# Patient Record
Sex: MALE | Race: WHITE | Employment: FULL TIME | URBAN - METROPOLITAN AREA
[De-identification: names, ages, dates, MRNs, and addresses within clinical notes are randomized per-mention and may not be internally consistent; named-entity substitution may affect disease eponyms.]

---

## 2017-04-06 ENCOUNTER — OFFICE VISIT (OUTPATIENT)
Dept: NEUROLOGY | Age: 57
End: 2017-04-06

## 2017-04-06 VITALS
BODY MASS INDEX: 29.58 KG/M2 | SYSTOLIC BLOOD PRESSURE: 120 MMHG | WEIGHT: 222 LBS | HEART RATE: 68 BPM | OXYGEN SATURATION: 99 % | DIASTOLIC BLOOD PRESSURE: 82 MMHG

## 2017-04-06 DIAGNOSIS — M50.30 DEGENERATIVE DISC DISEASE, CERVICAL: ICD-10-CM

## 2017-04-06 DIAGNOSIS — E78.01 FAMILIAL HYPERCHOLESTEROLEMIA: ICD-10-CM

## 2017-04-06 DIAGNOSIS — I10 ESSENTIAL HYPERTENSION: ICD-10-CM

## 2017-04-06 DIAGNOSIS — M54.12 CERVICAL RADICULOPATHY: Primary | ICD-10-CM

## 2017-04-06 NOTE — MR AVS SNAPSHOT
Visit Information Date & Time Provider Department Dept. Phone Encounter #  
 4/6/2017  9:00 AM Eliazar Urban MD [facility] at [facility] [phone] [id number] Follow-up Instructions Return if symptoms worsen or fail to improve. Allergies as of 4/6/2017  Review Complete On: 4/6/2017 By: Frank Harada No Known Allergies Current Immunizations  Never Reviewed Name Date Tdap 8/7/2015  6:44 PM  
  
 Not reviewed this visit You Were Diagnosed With   
  
 Codes Comments Cervical radiculopathy    -  Primary ICD-10-CM: M54.12 
ICD-9-CM: 723.4 Essential hypertension     ICD-10-CM: I10 
ICD-9-CM: 401.9 Familial hypercholesterolemia     ICD-10-CM: E78.01 
ICD-9-CM: 272.0 Vitals BP Pulse Weight(growth percentile) SpO2 BMI Smoking Status 120/82 68 222 lb (100.7 kg) 99% 29.58 kg/m2 Never Smoker BMI and BSA Data Body Mass Index Body Surface Area  
 29.58 kg/m 2 2.27 m 2 Preferred Pharmacy Pharmacy Name Phone [facility] [place], [address] [place] AT [facility] [phone] Your Updated Medication List  
  
   
This list is accurate as of: 4/6/17  9:41 AM.  Always use your most recent med list.  
  
  
  
  
 atorvastatin 40 mg tablet Commonly known as:  LIPITOR Take 40 mg by mouth daily. Follow-up Instructions Return if symptoms worsen or fail to improve. Introducing [facility]! [employer] introduces [employer] patient portal. Now you can access parts of your medical record, email your doctor's office, and request medication refills online. 1. In your internet browser, go to [email or web address] 2. Click on the First Time User? Click Here link in the Sign In box. You will see the New Member Sign Up page. 3. Enter your [employer] Access Code exactly as it appears below.  You will not need to use this code after youve completed the sign-up process. If you do not sign up before the expiration date, you must request a new code. · [employer] Access Code: [email or web address] Expires: 7/5/2017  8:53 AM 
 
4. Enter the last four digits of your Social Security Number (xxxx) and Date of Birth (mm/dd/yyyy) as indicated and click Submit. You will be taken to the next sign-up page. 5. Create a [employer] ID. This will be your [employer] login ID and cannot be changed, so think of one that is secure and easy to remember. 6. Create a [employer] password. You can change your password at any time. 7. Enter your Password Reset Question and Answer. This can be used at a later time if you forget your password. 8. Enter your e-mail address. You will receive e-mail notification when new information is available in [address] 9. Click Sign Up. You can now view and download portions of your medical record. 10. Click the Download Summary menu link to download a portable copy of your medical information. If you have questions, please visit the Frequently Asked Questions section of the [employer] website. Remember, [employer] is NOT to be used for urgent needs. For medical emergencies, dial 911. Now available from your iPhone and Android! Please provide this summary of care documentation to your next provider. Your primary care clinician is listed as Connor Butler. If you have any questions after today's visit, please call [phone].

## 2017-04-06 NOTE — PROGRESS NOTES
NEUROLOGY HISTORY AND PHYSICAL    Name Winston [address] Age 55 y.o. MRN [id number]  1960     Consulting Physician: No att. providers found      Chief Complaint:  Right arm sensory loss     This is a 55 y.o. right handed male with a medical history of hypertension and hyperlipidemia. He was driving his car. He suddenly felt that his arm had gone numb. He had no weakness. He had no facial numbness. He had no numbness of the torso. He had experienced right ulnar nerve numbness in the past from known degenerative disc disease. The episode lasted approximately 10 minutes and resolved. It has not recurred since. Assessment and Plan   1. Cervical radiculopathy  No symptoms at this time. Will evaluate if this becomes  A permament issue    2. Degenerative disc disease, cervical  Discussed      3. Essential hypertension  Discussed     4. Familial hypercholesterolemia  dicussed continue lipitor      Patient Allergies    Review of patient's allergies indicates no known allergies. Current Outpatient Prescriptions   Medication Sig    atorvastatin (LIPITOR) 40 mg tablet Take 40 mg by mouth daily. No current facility-administered medications for this visit. Past Medical History:   Diagnosis Date    Arthritis     High blood pressure     Ill-defined condition     kidney stones    Joint pain     Migraine     Muscle weakness     Ringing in the ears     Snoring     Visual disturbance        Social History   Substance Use Topics    Smoking status: Never Smoker    Smokeless tobacco: Not on file    Alcohol use No     Family History   Problem Relation Age of Onset    Stroke Mother     Colon Cancer Brother      Review of Systems   Constitutional: Negative for chills and fever. HENT: Negative for ear pain. Eyes: Negative for pain and discharge. Respiratory: Negative for cough and hemoptysis. Cardiovascular: Negative for chest pain and claudication.    Gastrointestinal: Negative for constipation and diarrhea. Genitourinary: Negative for flank pain and hematuria. Musculoskeletal: Positive for joint pain, myalgias and neck pain. Negative for back pain. Left wrist pain chronic   Skin: Negative for itching and rash. Neurological: Negative for headaches. Ocular migraines   Endo/Heme/Allergies: Negative for environmental allergies. Does not bruise/bleed easily. Psychiatric/Behavioral: Negative for depression and hallucinations. The patient is nervous/anxious. Visit Vitals    /82    Pulse 68    Wt 222 lb (100.7 kg)    SpO2 99%    BMI 29.58 kg/m2      Physical Exam   Constitutional: He is oriented to person, place, and time and well-developed, well-nourished, and in no distress. HENT:   Head: Normocephalic and atraumatic. Eyes: Conjunctivae and EOM are normal. Pupils are equal, round, and reactive to light. Neck: Neck supple. No JVD present. Carotid bruit is not present. No thyromegaly present. Cardiovascular: Normal rate, regular rhythm and normal heart sounds. Pulmonary/Chest: Effort normal and breath sounds normal. No respiratory distress. He has no wheezes. He has no rales. Abdominal: Soft. Bowel sounds are normal. He exhibits no distension. There is no tenderness. Neurological: He is alert and oriented to person, place, and time. He has normal sensation, normal strength, normal reflexes and intact cranial nerves. Gait normal.   Skin: No rash noted. No erythema.

## 2019-02-11 ENCOUNTER — OFFICE VISIT (OUTPATIENT)
Dept: SLEEP MEDICINE | Age: 59
End: 2019-02-11

## 2019-02-11 VITALS
HEART RATE: 82 BPM | SYSTOLIC BLOOD PRESSURE: 106 MMHG | WEIGHT: 220.6 LBS | HEIGHT: 72 IN | BODY MASS INDEX: 29.88 KG/M2 | OXYGEN SATURATION: 96 % | DIASTOLIC BLOOD PRESSURE: 70 MMHG

## 2019-02-11 DIAGNOSIS — G47.33 OSA (OBSTRUCTIVE SLEEP APNEA): Primary | ICD-10-CM

## 2019-02-11 DIAGNOSIS — I10 ESSENTIAL HYPERTENSION: ICD-10-CM

## 2019-02-11 DIAGNOSIS — Z86.59 H/O: DEPRESSION: ICD-10-CM

## 2019-02-11 RX ORDER — LISINOPRIL 10 MG/1
10 TABLET ORAL DAILY
Refills: 6 | COMMUNITY
Start: 2019-01-28

## 2019-02-11 RX ORDER — TAMSULOSIN HYDROCHLORIDE 0.4 MG/1
0.4 CAPSULE ORAL DAILY
Refills: 0 | COMMUNITY
Start: 2019-01-25

## 2019-02-11 RX ORDER — DULOXETIN HYDROCHLORIDE 30 MG/1
30 CAPSULE, DELAYED RELEASE ORAL DAILY
Refills: 1 | COMMUNITY
Start: 2019-02-05

## 2019-02-11 RX ORDER — CIPROFLOXACIN 500 MG/1
500 TABLET ORAL 2 TIMES DAILY
Refills: 0 | COMMUNITY
Start: 2019-01-25 | End: 2022-07-11 | Stop reason: ALTCHOICE

## 2019-02-11 NOTE — PROGRESS NOTES
[address] [place], [address]  Tel.  [phone]  Fax. [address] [facility] [address]  [place], [address] [facility]  Tel.  [phone]  Fax. [phone] [address] [facility]  Tel.  [phone]  Fax. [phone]         Subjective:      Kim Izquierdo is an 63 y.o. male referred for evaluation for a sleep disorder. He complains of snoring associated with tossing and turning, kicking, excessive daytime tiredness. Symptoms began several years ago, unchanged since that time. He usually can fall asleep in 5 minutes. Family or house members note snoring. He denies completely or partially paralyzed while falling asleep or waking up. Winston Moreno does wake up frequently at night. He is not bothered by waking up too early and left unable to get back to sleep. He actually sleeps about 6 hours at night and wakes up about 2 times during the night. He does work shifts:  First Shift. Winston indicates he does not get too little sleep at night. His bedtime is 2200. He awakens at 0. He does not take naps. He has the following observed behaviors: Loud snoring, Twitching of legs or feet, Kicking with legs;  . Other remarks:   He denies of a significant urge to move extremities due to discomfort or other sensation and denies of dream enactment behavior. [place] Sleepiness Score: 4     No Known Allergies      Current Outpatient Medications:     DULoxetine (CYMBALTA) 30 mg capsule, Take 30 mg by mouth daily. , Disp: , Rfl: 1    lisinopril (PRINIVIL, ZESTRIL) 10 mg tablet, Take 10 mg by mouth daily. , Disp: , Rfl: 6    tamsulosin (FLOMAX) 0.4 mg capsule, Take 0.4 mg by mouth daily. , Disp: , Rfl: 0    ciprofloxacin HCl (CIPRO) 500 mg tablet, Take 500 mg by mouth two (2) times a day., Disp: , Rfl: 0    atorvastatin (LIPITOR) 40 mg tablet, Take 40 mg by mouth daily. , Disp: , Rfl:      He  has a past medical history of Arthritis, High blood pressure, Ill-defined condition, Joint pain, Migraine, Muscle weakness, Ringing in the ears, Snoring, and Visual disturbance. He  has a past surgical history that includes hx other surgical.    He family history includes Colon Cancer in his brother; Stroke in his mother. He  reports that he has quit smoking. he has never used smokeless tobacco. He reports that he drinks alcohol. He reports that he does not use drugs. Review of Systems:  Constitutional:  No significant weight loss or weight gain  Eyes:  No blurred vision  CVS:  No significant chest pain  Pulm:  No significant shortness of breath  GI:  No significant nausea or vomiting  :  significant nocturia  Musculoskeletal:  No significant joint pain at night  Skin:  No significant rashes  Neuro:  No significant dizziness   Psych:  active mood issues - recently started taking    Sleep Review of Systems: notable for no difficulty falling asleep; frequent awakenings at night;  regular dreaming noted; no nightmares ; no early morning headaches; no memory problems; no concentration issues; no history of any automobile or occupational accidents due to daytime drowsiness. Objective:     Visit Vitals  /70 (BP 1 Location: Left arm)   Pulse 82   Ht 6' (1.829 m)   Wt 220 lb 9.6 oz (100.1 kg)   SpO2 96%   BMI 29.92 kg/m²         General:   Not in acute distress   Eyes:  Anicteric sclerae, no obvious strabismus   Nose:  No obvious nasal septum deviation    Oropharynx:   Class 4 oropharyngeal outlet, thick tongue base, uvula could not be seen due to low-lying soft palate, narrow tonsilo-pharyngeal pilars   Tonsils:   tonsils are not seen due to low-lying soft palate   Neck:   Neck circ.  in \"inches\": 16.5; midline trachea   Chest/Lungs:  Equal lung expansion, clear on auscultation    CVS:  Normal rate, regular rhythm; no JVD   Skin:  Warm to touch; no obvious rashes   Neuro:  No focal deficits ; no obvious tremor    Psych:  Normal affect,  normal countenance; Assessment:       ICD-10-CM ICD-9-CM    1. JULIANNE (obstructive sleep apnea) G47.33 327.23 SLEEP STUDY UNATTENDED, 4 CHANNEL   2. Essential hypertension I10 401.9    3. BMI 29.0-29.9,adult Z68.29 V85.25    4. H/O: depression Z86.59 V11.8          Plan:     * The patient currently has a Moderate Risk for having sleep apnea. STOP-BANG score 5.  * Sleep testing was ordered for initial evaluation. * He was provided information on sleep apnea including coresponding risk factors and the importance of proper treatment. * Treatment options if indicated were reviewed today. Patient agrees to a trial of PAP therapy if indicated. * Counseling was provided regarding proper sleep hygiene (including effect of light on sleep), paradoxical intention, stimulus control, sleep environment safety and safe driving. * Effect of sleep disturbance on weight was reviewed. We have recommended a dedicated weight loss through appropriate diet and an exercise regiment as significant weight reduction has been shown to reduce severity of obstructive sleep apnea. * Patient agrees to telephone [phone]  follow-up by myself or lead sleep technologist shortly after sleep study to review results and plan final management.     (patient has given permission for a message to be left regarding test results and further management if patient cannot be cannot be reached directly). Thank you for allowing us to participate in your patient's medical care. We'll keep you updated on these investigations. Rock Ryan MD, FAASM  Electronically signed.  02/11/19

## 2019-02-11 NOTE — PROGRESS NOTES
[address] [facility]. [place], [address]  Tel.  [phone]  Fax. 100 [facility] 60  [place], [address] [facility]  Tel.  [phone]  Fax. [phone] [address] [facility] Abelardo Collazo   Tel.  [phone]  Fax. [phone]       S>Winston Pradhan is a 63 y.o. male seen today to receive a home sleep testing unit (HST). · Patient was educated on proper hookup and operation of the HST. · Instruction forms and documentation were reviewed and signed. · The patient demonstrated good understanding of the HST. O>    Visit Vitals  /70 (BP 1 Location: Left arm)   Pulse 82   Ht 6' (1.829 m)   Wt 220 lb 9.6 oz (100.1 kg)   SpO2 96%   BMI 29.92 kg/m²    Neck circ. in \"inches\": 16.5    A>  1. JULIANNE (obstructive sleep apnea)    2. Essential hypertension    3. BMI 29.0-29.9,adult    4. H/O: depression          P>  · General information regarding operations and maintenance of the device was provided. · He was provided information on sleep apnea including coresponding risk factors and the importance of proper treatment. · Follow-up appointment was made to return the HST. He will be contacted once the results have been reviewed. · He was asked to contact our office for any problems regarding his home sleep test study.

## 2019-02-11 NOTE — PATIENT INSTRUCTIONS
[address] [facility] [place], [address]  Tel.  [phone]  Fax. 100 [facility] 60  [place], [address] [facility]  Tel.  [phone]  Fax. [phone] [address]  Tel.  [phone]  Fax. [phone]     Sleep Apnea: After Your Visit  Your Care Instructions  Sleep apnea occurs when you frequently stop breathing for 10 seconds or longer during sleep. It can be mild to severe, based on the number of times per hour that you stop breathing or have slowed breathing. Blocked or narrowed airways in your nose, mouth, or throat can cause sleep apnea. Your airway can become blocked when your throat muscles and tongue relax during sleep. Sleep apnea is common, occurring in 1 out of 20 individuals. Individuals having any of the following characteristics should be evaluated and treated right away due to high risk and detrimental consequences from untreated sleep apnea:  1. Obesity  2. Congestive Heart failure  3. Atrial Fibrillation  4. Uncontrolled Hypertension  5. Type II Diabetes  6. Night-time Arrhythmias  7. Stroke  8. Pulmonary Hypertension  9. High-risk Driving Populations (pilots, truck drivers, etc.)  10. Patients Considering Weight-loss Surgery    How do you know you have sleep apnea? You probably have sleep apnea if you answer 'yes' to 3 or more of the following questions:  S - Have you been told that you Snore? T - Are you often Tired during the day? O - Has anyone Observed you stop breathing while sleeping? P- Do you have (or are being treated for) high blood Pressure? B - Are you obese (Body Mass Index > 35)? A - Is your Age 48years old or older? N - Is your Neck size greater than 16 inches? G - Are you male Gender? A sleep physician can prescribe a breathing device that prevents tissues in the throat from blocking your airway.  Or your doctor may recommend using a dental device (oral breathing device) to help keep your airway open. In some cases, surgery may be needed to remove enlarged tissues in the throat. Follow-up care is a key part of your treatment and safety. Be sure to make and go to all appointments, and call your doctor if you are having problems. It's also a good idea to know your test results and keep a list of the medicines you take. How can you care for yourself at home? · Lose weight, if needed. It may reduce the number of times you stop breathing or have slowed breathing. · Go to bed at the same time every night. · Sleep on your side. It may stop mild apnea. If you tend to roll onto your back, sew a pocket in the back of your pajama top. Put a tennis ball into the pocket, and stitch the pocket shut. This will help keep you from sleeping on your back. · Avoid alcohol and medicines such as sleeping pills and sedatives before bed. · Do not smoke. Smoking can make sleep apnea worse. If you need help quitting, talk to your doctor about stop-smoking programs and medicines. These can increase your chances of quitting for good. · Prop up the head of your bed 4 to 6 inches by putting bricks under the legs of the bed. · Treat breathing problems, such as a stuffy nose, caused by a cold or allergies. · Use a continuous positive airway pressure (CPAP) breathing machine if lifestyle changes do not help your apnea and your doctor recommends it. The machine keeps your airway from closing when you sleep. · If CPAP does not help you, ask your doctor whether you should try other breathing machines. A bilevel positive airway pressure machine has two types of air pressureâone for breathing in and one for breathing out. Another device raises or lowers air pressure as needed while you breathe. · If your nose feels dry or bleeds when using one of these machines, talk with your doctor about increasing moisture in the air. A humidifier may help.   · If your nose is runny or stuffy from using a breathing machine, talk with your doctor about using decongestants or a corticosteroid nasal spray. When should you call for help? Watch closely for changes in your health, and be sure to contact your doctor if:  · You still have sleep apnea even though you have made lifestyle changes. · You are thinking of trying a device such as CPAP. · You are having problems using a CPAP or similar machine. Where can you learn more? Go to Xiaoi Robert. Enter Y745 in the search box to learn more about \"Sleep Apnea: After Your Visit. \"   © 7721-7059 Healthwise, Incorporated. Care instructions adapted under license by [facility] [employer] (which disclaims liability or warranty for this information). This care instruction is for use with your licensed healthcare professional. If you have questions about a medical condition or this instruction, always ask your healthcare professional. Disha Cutler any warranty or liability for your use of this information. PROPER SLEEP HYGIENE    What to avoid  · Do not have drinks with caffeine, such as coffee or black tea, for 8 hours before bed. · Do not smoke or use other types of tobacco near bedtime. Nicotine is a stimulant and can keep you awake. · Avoid drinking alcohol late in the evening, because it can cause you to wake in the middle of the night. · Do not eat a big meal close to bedtime. If you are hungry, eat a light snack. · Do not drink a lot of water close to bedtime, because the need to urinate may wake you up during the night. · Do not read or watch TV in bed. Use the bed only for sleeping and sexual activity. What to try  · Go to bed at the same time every night, and wake up at the same time every morning. Do not take naps during the day. · Keep your bedroom quiet, dark, and cool. · Get regular exercise, but not within 3 to 4 hours of your bedtime. .  · Sleep on a comfortable pillow and mattress.   · If watching the clock makes you anxious, turn it facing away from you so you cannot see the time. · If you worry when you lie down, start a worry book. Well before bedtime, write down your worries, and then set the book and your concerns aside. · Try meditation or other relaxation techniques before you go to bed. · If you cannot fall asleep, get up and go to another room until you feel sleepy. Do something relaxing. Repeat your bedtime routine before you go to bed again. · Make your house quiet and calm about an hour before bedtime. Turn down the lights, turn off the TV, log off the computer, and turn down the volume on music. This can help you relax after a busy day. Drowsy Driving  The [address] [place] [address] Road Traffic Safety Administration cites drowsiness as a causing factor in more than 464,214 police reported crashes annually, resulting in 76,000 injuries and 1,500 deaths. Other surveys suggest 55% of people polled have driven while drowsy in the past year, 23% had fallen asleep but not crashed, 3% crashed, and 2% had and accident due to drowsy driving. Who is at risk? Young Drivers: One study of drowsy driving accidents states that 55% of the drivers were under 25 years. Of those, 75% were male. Shift Workers and Travelers: People who work overnight or travel across time zones frequently are at higher risk of experiencing Circadian Rhythm Disorders. They are trying to work and function when their body is programed to sleep. Sleep Deprived: Lack of sleep has a serious impact on your ability to pay attention or focus on a task. Consistently getting less than the average of 8 hours your body needs creates partial or cumulative sleep deprivation. Untreated Sleep Disorders: Sleep Apnea, Narcolepsy, R.L.S., and other sleep disorders (untreated) prevent a person from getting enough restful sleep. This leads to excessive daytime sleepiness and increases the risk for drowsy driving accidents by up to 7 times.   Medications / Alcohol: Even over the counter medications can cause drowsiness. Medications that impair a drivers attention should have a warning label. Alcohol naturally makes you sleepy and on its own can cause accidents. Combined with excessive drowsiness its effects are amplified. Signs of Drowsy Driving:   * You don't remember driving the last few miles   * You may drift out of your azul   * You are unable to focus and your thoughts wander   * You may yawn more often than normal   * You have difficulty keeping your eyes open / nodding off   * Missing traffic signs, speeding, or tailgating  Prevention-   Good sleep hygiene, lifestyle and behavioral choices have the most impact on drowsy driving. There is no substitute for sleep and the average person requires 8 hours nightly. If you find yourself driving drowsy, stop and sleep. Consider the sleep hygiene tips provided during your visit as well. Medication Refill Policy: Refills for all medications require 1 week advance notice. Please have your pharmacy fax a refill request. We are unable to fax, or call in \"controled substance\" medications and you will need to pick these prescriptions up from our office. Miro Activation    Thank you for requesting access to Miro. Please follow the instructions below to securely access and download your online medical record. Miro allows you to send messages to your doctor, view your test results, renew your prescriptions, schedule appointments, and more. How Do I Sign Up? 1. In your internet browser, go to [email or web address]. 2. Click on the First Time User? Click Here link in the Sign In box. You will see the New Member Sign Up page. 3. Enter your Miro Access Code exactly as it appears below. You will not need to use this code after youve completed the sign-up process. If you do not sign up before the expiration date, you must request a new code. Miro Access Code:  [email or web address]  Expires: 3/28/2019 11:13 AM (This is the date your Indeed access code will )    4. Enter the last four digits of your Social Security Number (xxxx) and Date of Birth (mm/dd/yyyy) as indicated and click Submit. You will be taken to the next sign-up page. 5. Create a 3scalet ID. This will be your Indeed login ID and cannot be changed, so think of one that is secure and easy to remember. 6. Create a Indeed password. You can change your password at any time. 7. Enter your Password Reset Question and Answer. This can be used at a later time if you forget your password. 8. Enter your e-mail address. You will receive e-mail notification when new information is available in [address] 9. Click Sign Up. You can now view and download portions of your medical record. 10. Click the Download Summary menu link to download a portable copy of your medical information. Additional Information    If you have questions, please call [phone]. Remember, Indeed is NOT to be used for urgent needs. For medical emergencies, dial 911.

## 2019-02-12 ENCOUNTER — TELEPHONE (OUTPATIENT)
Dept: SLEEP MEDICINE | Age: 59
End: 2019-02-12

## 2019-02-12 NOTE — TELEPHONE ENCOUNTER
Patient called to inform us that he did not sleep well with the HSAT. [facility] return device tomorrow 2/13/19.

## 2019-02-21 ENCOUNTER — DOCUMENTATION ONLY (OUTPATIENT)
Dept: SLEEP MEDICINE | Age: 59
End: 2019-02-21

## 2019-02-22 ENCOUNTER — TELEPHONE (OUTPATIENT)
Dept: SLEEP MEDICINE | Age: 59
End: 2019-02-22

## 2020-08-19 NOTE — PROGRESS NOTES
Neurology Note    Patient ID:  Merrick Blackwell  [id number]  62 y.o.  1960      Date of Consultation:  August 20, 2020    Referring Physician: Dr. Jada Pineda    Reason for Consultation: Neurological symptoms    Subjective: I just do not feel right       History of Present Illness:   Merrick Blackwell is a 62 y.o. male who presents to the neurology clinic at [facility] for an evaluation. Patient states that he has battled with symptoms for some time but they continue to worsen. He does work as a  and is able to maintain full employment. He feels that he does have a good life but does not feel that he is functioning as well as he should. He states he feels like he is out of control of his body. He feels more disconnected. This can last for few seconds. He states that he does also have trouble getting out of bed in the morning. He has no motivation to go and do things. Once he starts in activity he feels that he is able to continue with that but he has trouble starting his day. He feels that he is depressed. He states he has tried medication and TMS therapy in the past and has only noticed a minimal amount improvement. He states sometimes the side effects of the medication have been worse. He has now started to take testosterone on his own accord to see if this would help him. He has done this for about 2 months and has not noticed any real improvement. He states he has had a few neurological attacks in the past he would notice focal weakness or sensory disturbance. He did have neuro imaging which was unremarkable. He also did have a panic attack in the past.    He feels that his weight is stable. He states he does have a history of ocular migraines    He also states he does have sensory disturbance and coldness in his feet.   He is unsure when the symptoms started but have been present for some time    He also reports that he has had episodes of benign positional vertigo which have responded to Epley maneuvers in the past      Past Medical History:   Diagnosis Date    Arthritis     High blood pressure     Ill-defined condition     kidney stones    Joint pain     Migraine     Muscle weakness     Ringing in the ears     Snoring     Visual disturbance       Slurred speech in the past. He was concerned about a stroke. Work-up was normal    Past Surgical History:   Procedure Laterality Date    HX OTHER SURGICAL      inguinal hernia        Family History   Problem Relation Age of Onset    Stroke Mother     Colon Cancer Brother     Heart Disease Father         Social History     Tobacco Use    Smoking status: Former Smoker    Smokeless tobacco: Never Used   Substance Use Topics    Alcohol use: Yes     Frequency: Monthly or less     Comment: 12 pack every wek-end        No Known Allergies     Prior to Admission medications    Medication Sig Start Date End Date Taking? Authorizing Provider   TESTOSTERONE CYPIONATE IM by IntraMUSCular route every seven (7) days. Yes Provider, Historical   CHORIONIC GONADOTROPIN, HUMAN IM by IntraMUSCular route two (2) times a week. Yes Provider, Historical   lisinopril (PRINIVIL, ZESTRIL) 10 mg tablet Take 10 mg by mouth daily. 1/28/19  Yes Provider, Historical   atorvastatin (LIPITOR) 40 mg tablet Take 40 mg by mouth daily. 7/29/15  Yes Other, MD Telma   DULoxetine (CYMBALTA) 30 mg capsule Take 30 mg by mouth daily. 2/5/19   Provider, Historical   tamsulosin (FLOMAX) 0.4 mg capsule Take 0.4 mg by mouth daily. 1/25/19   Provider, Historical   ciprofloxacin HCl (CIPRO) 500 mg tablet Take 500 mg by mouth two (2) times a day.  1/25/19   Provider, Historical       Review of Systems:    General, constitutional: headaches  Eyes, vision: negative  Ears, nose, throat: vertigo, visual   Cardiovascular, heart: negative  Respiratory: negative  Gastrointestinal: negative  Genitourinary: negative  Musculoskeletal: joint pain, muscle abram, muscle weakness  Skin and integumentary: negative  Psychiatric: anxiety, depression  Endocrine: negative  Neurological: negative, except for HPI  Hematologic/lymphatic: negative  Allergy/immunology: negative      Objective:     Visit Vitals  /70   Pulse 79   Ht 6' (1.829 m)   Wt 222 lb (100.7 kg)   SpO2 98%   BMI 30.11 kg/m²       Physical Exam:      General:  appears well nourished in no acute distress  Neck: no carotid bruits  Lungs: clear to auscultation  Heart:  no murmurs, regular rate  Lower extremity: peripheral pulses palpable and no edema  Skin: intact    Neurological exam:    Awake, alert, oriented to person, place and time  Recent and remote memory were normal  Attention and concentration were intact  Language was intact. There was no aphasia  Speech: no dysarthria  Fund of knowledge was preserved    Cranial nerves:   II-XII were tested    Perrrla  Fundoscopic examination revealed venous pulsations and no clear abnormalities  Visual fields were full  Eomi, no evidence of nystagmus  Facial sensation:  normal and symmetric  Facial motor: normal and symmetric  Hearing intact  SCM strength intact  Tongue: midline without fasciculations    Motor: Tone normal    No evidence of fasciculations    Strength testing:   deltoid triceps biceps Wrist ext. Wrist flex. intrinsics Hip flex. Hip ext. Knee ext. Knee flex Dorsi flex Plantar flex   Right 5 5 5 5 5 5 5 5 5 5 5 5   Left 5 5 5 5 5 5 5 5 5 5 5 5         Sensory:  Upper extremity: intact to pp, light touch, and vibration > 10 seconds  Lower extremity: Pinprick was decreased to just below his ankles bilaterally. Vibration was 5 seconds in his toes and 9 seconds at his ankles    Reflexes:    Right Left  Biceps  2 2  Triceps 2 2  Brachiorad. 2 2  Patella  2 2  Achilles 2 2    Plantar response:  flexor bilaterally      Cerebellar testing:  no tremor apparent, finger/nose and boogie were intact    Romberg: absent, there is a mild amount of swaying    Gait: steady. Heel, toe were intact. He does have difficulty with tandem gait    Labs:     No results found for: HBA1C, NA, K, CL, GLU, BUN, CREA, CA, WBC, HCT, HGB, PLT, LDL, TMY0DKOM, HCTEXT, HGBEXT, PLTEXT, GLM4YEZP, HCTEXT, HGBEXT, PLTEXT    Imaging:    No results found for this or any previous visit. No results found for this or any previous visit. Assessment and Plan:    The patient is a pleasant 81-year-old gentleman who was referred for a myriad of neurological symptoms where he feels disconnected from his body as well as sensory sermons in his distal lower extremities. Lower extremity sensory symptoms:  His examination is suggestive of a distal lower extremity neuropathy. The differential for this does include metabolic, infectious, inflammatory, toxin. He has no known medical condition which would contribute to this. He does state he drinks some alcohol and used to be heavier in the past but does not drink a heavy amount now per his report. I would like to check serology looking for possible causes of the neuropathy. He did have lab work this morning by his primary care doctor. Once we get those results, additional testing may need to be included including a thyroid panel, vitamin B12, vitamin D, serum immunofixation. May need to consider a emg/ncs in the future    Neuropathy:  we reviewed the causes contributing to the neuropathy. We discussed the importance of exercise and activity. I also reviewed the importance of safety with ambulation and ways to prevents falls. A myriad  of other neurological symptoms:  I did tell him that the rest of his neurological examination was normal.  I do think a good portion of his symptoms is related to a manifestation of his depression and anxiety. He tends to agree with that and has struggled with this for years. I did ask him to follow-up with his primary care doctor in regards to the symptoms and ensure that he is being adequately treated. There is no problem list on file for this patient. The patient should return to clinic after lab results    Renewed medication: none. Lab results were reviewed today. I spent  60   minutes with the patient  with over 50 % of the time counseling and coordinating the care plan in regards to the diagnosis, diagnostic testing, and treatment plan. The patient had the ability to ask questions and all questions were answered.                   Signed By:  Vanesa Alfaro DO FAAN    August 20, 2020

## 2020-08-20 ENCOUNTER — OFFICE VISIT (OUTPATIENT)
Dept: NEUROLOGY | Age: 60
End: 2020-08-20
Payer: COMMERCIAL

## 2020-08-20 VITALS
WEIGHT: 222 LBS | SYSTOLIC BLOOD PRESSURE: 132 MMHG | HEART RATE: 79 BPM | BODY MASS INDEX: 30.07 KG/M2 | HEIGHT: 72 IN | OXYGEN SATURATION: 98 % | DIASTOLIC BLOOD PRESSURE: 70 MMHG

## 2020-08-20 DIAGNOSIS — G62.9 NEUROPATHY: Primary | ICD-10-CM

## 2020-08-20 PROCEDURE — 99205 OFFICE O/P NEW HI 60 MIN: CPT | Performed by: PSYCHIATRY & NEUROLOGY

## 2020-08-21 NOTE — PROGRESS NOTES
Hi,    The labs that have returned so far have been normal.  There are some labs not back yet.     Sincerely,  Dr Kenia Daniels

## 2020-08-24 LAB
ACE SERPL-CCNC: 20 U/L (ref 14–82)
ALBUMIN SERPL ELPH-MCNC: 4 G/DL (ref 2.9–4.4)
ALBUMIN/GLOB SERPL: 1.7 {RATIO} (ref 0.7–1.7)
ALPHA1 GLOB SERPL ELPH-MCNC: 0.2 G/DL (ref 0–0.4)
ALPHA2 GLOB SERPL ELPH-MCNC: 0.7 G/DL (ref 0.4–1)
B-GLOBULIN SERPL ELPH-MCNC: 0.8 G/DL (ref 0.7–1.3)
CENTROMERE B AB SER-ACNC: <0.2 AI (ref 0–0.9)
CHROMATIN AB SERPL-ACNC: <0.2 AI (ref 0–0.9)
DSDNA AB SER-ACNC: 3 IU/ML (ref 0–9)
ENA JO1 AB SER-ACNC: <0.2 AI (ref 0–0.9)
ENA RNP AB SER-ACNC: 0.5 AI (ref 0–0.9)
ENA SCL70 AB SER-ACNC: <0.2 AI (ref 0–0.9)
ENA SM AB SER-ACNC: <0.2 AI (ref 0–0.9)
ENA SM+RNP AB SER-ACNC: <0.2 AI (ref 0–0.9)
ENA SS-A AB SER-ACNC: <0.2 AI (ref 0–0.9)
ENA SS-B AB SER-ACNC: <0.2 AI (ref 0–0.9)
GAMMA GLOB SERPL ELPH-MCNC: 0.7 G/DL (ref 0.4–1.8)
GLOBULIN SER-MCNC: 2.4 G/DL (ref 2.2–3.9)
HBA1C MFR BLD: 5.1 % (ref 4.8–5.6)
IGA SERPL-MCNC: 159 MG/DL (ref 90–386)
IGG SERPL-MCNC: 915 MG/DL (ref 603–1613)
IGM SERPL-MCNC: 34 MG/DL (ref 20–172)
INTERPRETATION SERPL IEP-IMP: NORMAL
M PROTEIN SERPL ELPH-MCNC: NORMAL G/DL
PLEASE NOTE:, 149534: NORMAL
PROT SERPL-MCNC: 6.4 G/DL (ref 6–8.5)
RIBOSOMAL P AB SER-ACNC: <0.2 AI (ref 0–0.9)
SEE BELOW:, 164879: NORMAL
T4 FREE SERPL-MCNC: 1.03 NG/DL (ref 0.82–1.77)
TSH SERPL DL<=0.005 MIU/L-ACNC: 2.84 UIU/ML (ref 0.45–4.5)
VIT B12 SERPL-MCNC: 325 PG/ML (ref 232–1245)

## 2021-03-24 ENCOUNTER — HOSPITAL ENCOUNTER (OUTPATIENT)
Dept: GENERAL RADIOLOGY | Age: 61
Discharge: HOME | End: 2021-03-24
Attending: INTERNAL MEDICINE
Payer: COMMERCIAL

## 2021-03-24 ENCOUNTER — TRANSCRIBE ORDER (OUTPATIENT)
Dept: GENERAL RADIOLOGY | Age: 61
End: 2021-03-24

## 2021-03-24 DIAGNOSIS — R06.02 SHORTNESS OF BREATH: ICD-10-CM

## 2021-03-24 DIAGNOSIS — R06.02 SHORTNESS OF BREATH: Primary | ICD-10-CM

## 2021-03-24 PROCEDURE — 71046 X-RAY EXAM CHEST 2 VIEWS: CPT

## 2022-02-01 ENCOUNTER — TELEPHONE (OUTPATIENT)
Dept: NEUROLOGY | Age: 62
End: 2022-02-01

## 2022-02-01 NOTE — TELEPHONE ENCOUNTER
Pt's wife called to schedule an appt. Pt does not want to be seen by Dr. Cynda Epley. Will Dr. Cynda Epley release from care so We can schedule with Dr. Carl Dai? I will call pt's wife back to schedule after confirmation of TIRSO.

## 2022-07-11 ENCOUNTER — OFFICE VISIT (OUTPATIENT)
Dept: NEUROLOGY | Age: 62
End: 2022-07-11
Payer: COMMERCIAL

## 2022-07-11 VITALS
RESPIRATION RATE: 16 BRPM | OXYGEN SATURATION: 98 % | BODY MASS INDEX: 31.51 KG/M2 | HEART RATE: 62 BPM | WEIGHT: 232.6 LBS | HEIGHT: 72 IN | SYSTOLIC BLOOD PRESSURE: 90 MMHG | TEMPERATURE: 98 F | DIASTOLIC BLOOD PRESSURE: 62 MMHG

## 2022-07-11 DIAGNOSIS — R20.2 PARESTHESIA: ICD-10-CM

## 2022-07-11 DIAGNOSIS — G62.9 NEUROPATHY: Primary | ICD-10-CM

## 2022-07-11 DIAGNOSIS — G62.9 NEUROPATHY: ICD-10-CM

## 2022-07-11 PROCEDURE — 99205 OFFICE O/P NEW HI 60 MIN: CPT | Performed by: PSYCHIATRY & NEUROLOGY

## 2022-07-11 NOTE — PROGRESS NOTES
Neurology progress note      HISTORY PROVIDED BY: patient    Chief Complaint:   Chief Complaint   Patient presents with    New Patient     neuropathy- numbness in rt leg, 3 toes on lf foot, numbness in lf forearm      Subjective:    Hannah Ibarra is a 65 y.o. right handed male who presents in consultation for numbness and tingling sensation of the extremities. This is a 61-year-old right-handed male with history of degenerative joint disease, hypertension, cholelithiasis, migraine headaches muscle weakness, tinnitus, sleep disorder, visual disturbance, who was referred to the clinic to evaluate for numbness and tingling several different parts of the extremities  Patient says numbness and tingling sensation on the right outer part of the thigh to the foot involving the last 3 toes and numbness in the left arm has been going on for some time now, initially, patient says he will try to go back to bed assisted so patient decided to come into the clinic for evaluation. Patient was seen in the clinic about a year ago, evaluated for nonspecific neurological symptoms with elements of depression and anxiety, at that time, basic blood work-up was performed, result was unremarkable. It was noted that nerve conduction study might be performed if the symptoms continued, no medication was given. Currently patient says his symptoms continued and involved in all areas, and persistent. He denies dysphagia or odynophagia.   Review of Systems - General ROS: positive for  - fatigue and sleep disturbance  Psychological ROS: positive for - anxiety, depression, and sleep disturbances  Ophthalmic ROS: positive for - blurry vision and decreased vision  ENT ROS: positive for - headaches, tinnitus, and visual changes  Allergy and Immunology ROS: negative  Hematological and Lymphatic ROS: negative  Endocrine ROS: negative  Respiratory ROS: no cough, shortness of breath, or wheezing  Cardiovascular ROS: no chest pain or dyspnea on exertion  Gastrointestinal ROS: no abdominal pain, change in bowel habits, or black or bloody stools  Genito-Urinary ROS: no dysuria, trouble voiding, or hematuria  Musculoskeletal ROS: positive for - joint pain and muscle pain  Neurological ROS: positive for - dizziness, numbness/tingling, visual changes, and weakness  Dermatological ROS: negative   Past Medical History:   Diagnosis Date    Arthritis     High blood pressure     Ill-defined condition     kidney stones    Joint pain     Migraine     Muscle weakness     Ringing in the ears     Snoring     Visual disturbance       Past Surgical History:   Procedure Laterality Date    HX OTHER SURGICAL      inguinal hernia      Social History     Socioeconomic History    Marital status:      Spouse name: Not on file    Number of children: Not on file    Years of education: Not on file    Highest education level: Not on file   Occupational History    Not on file   Tobacco Use    Smoking status: Former     Types: Cigarettes     Quit date:      Years since quittin.6    Smokeless tobacco: Never   Vaping Use    Vaping Use: Never used   Substance and Sexual Activity    Alcohol use: Not Currently     Comment: not since     Drug use: No    Sexual activity: Not on file   Other Topics Concern    Not on file   Social History Narrative    Not on file     Social Determinants of Health     Financial Resource Strain: Not on file   Food Insecurity: Not on file   Transportation Needs: Not on file   Physical Activity: Not on file   Stress: Not on file   Social Connections: Not on file   Intimate Partner Violence: Not on file   Housing Stability: Not on file     Family History   Problem Relation Age of Onset    Stroke Mother     Colon Cancer Brother     Heart Disease Father          Objective:   ROS  As per HPI    No Known Allergies     Meds:  Outpatient Medications Prior to Visit   Medication Sig Dispense Refill    lisinopril (PRINIVIL, ZESTRIL) 10 mg tablet Take 10 mg by mouth daily. 6    atorvastatin (LIPITOR) 40 mg tablet Take 40 mg by mouth daily. TESTOSTERONE CYPIONATE IM by IntraMUSCular route every seven (7) days. (Patient not taking: Reported on 7/11/2022)      CHORIONIC GONADOTROPIN, HUMAN IM by IntraMUSCular route two (2) times a week. (Patient not taking: Reported on 7/11/2022)      DULoxetine (CYMBALTA) 30 mg capsule Take 30 mg by mouth daily. (Patient not taking: Reported on 7/11/2022)  1    tamsulosin (FLOMAX) 0.4 mg capsule Take 0.4 mg by mouth daily. (Patient not taking: Reported on 7/11/2022)  0    ciprofloxacin HCl (CIPRO) 500 mg tablet Take 500 mg by mouth two (2) times a day.  0     No facility-administered medications prior to visit. Imaging:  MRI Results (most recent):  No results found for this or any previous visit. CT Results (most recent):  No results found for this or any previous visit. Reviewed records in [facility] tab today    Lab Review   Results for orders placed or performed in visit on 07/11/22   RHEUMATOID FACTOR, QL   Result Value Ref Range    Rheumatoid factor <10.0 <14.0 IU/mL        Exam:  Visit Vitals  BP 90/62 (BP 1 Location: Left upper arm, BP Patient Position: Sitting, BP Cuff Size: Large adult)   Pulse 62   Temp 98 °F (36.7 °C) (Temporal)   Resp 16   Ht 6' (1.829 m)   Wt 232 lb 9.6 oz (105.5 kg)   SpO2 98%   BMI 31.55 kg/m²     General:  Alert, cooperative, no distress. Head:  Normocephalic, without obvious abnormality, atraumatic. Respiratory:  Heart:   Non labored breathing  Regular rate and rhythm, no murmurs   Neck:   2+ carotids, no bruits   Extremities: Warm, no cyanosis or edema. Pulses: 2+ radial pulses. Neurologic:  MS: Alert and oriented x 4, speech intact. Language intact, able to name, repeat, and follow all commands. Attention and fund of knowledge appropriate. Recent and remote memory intact.   Cranial Nerves:  II: visual fields Full to confrontation   II: pupils Equal, round, reactive to light   II: optic disc No papilledema   III,VII: ptosis none   III,IV,VI: extraocular muscles  EOMI, no nystagmus or diplopia   V: facial light touch sensation  normal   VII: facial muscle function   symmetric   VIII: hearing intact   IX: soft palate elevation  normal   XI: trapezius strength  5/5   XI: sternocleidomastoid strength 5/5   XII: tongue  Midline     Motor: normal bulk and tone, no tremor              Strength: 5/5 throughout, no PD  Sensory: Decrease sensation to LT, PP,temperature, and vibration left arm, bilateral lower extremity. Coordination: FTN and HTS intact, DEANNA intact,Romberg negative  Gait: normal gait, able to heel, toe, and tandem walk  Reflexes: 2+ symmetric,  Plantar: Mute         Assessment/Plan       ICD-10-CM ICD-9-CM    1. Neuropathy  G62.9 355.9 RHEUMATOID FACTOR, QL      RHEUMATOID FACTOR, QL      EMG NCV MOTOR WITH F/WAVE PER NERVE      HEAVY METALS PROFILE, UR      CK      VITAMIN D, 25 HYDROXY      PROTEIN ELECTROPHORESIS      ANGIOTENSIN CONVERTING ENZYME      TRIXIE, DIRECT, W/REFLEX      VITAMIN B12      ALDOLASE      CANCELED: ALDOLASE      CANCELED: VITAMIN B12      CANCELED: TRIXIE, DIRECT, W/REFLEX      CANCELED: ANGIOTENSIN CONVERTING ENZYME      CANCELED: PROTEIN ELECTROPHORESIS      CANCELED: VITAMIN D, 25 HYDROXY      CANCELED: CK      CANCELED: HEAVY METALS PROFILE, UR      2.  Paresthesia  R20.2 782.0 RHEUMATOID FACTOR, QL      RHEUMATOID FACTOR, QL      EMG NCV MOTOR WITH F/WAVE PER NERVE      HEAVY METALS PROFILE, UR      CK      VITAMIN D, 25 HYDROXY      PROTEIN ELECTROPHORESIS      ANGIOTENSIN CONVERTING ENZYME      TRIXIE, DIRECT, W/REFLEX      VITAMIN B12      ALDOLASE      CANCELED: ALDOLASE      CANCELED: VITAMIN B12      CANCELED: TRIXIE, DIRECT, W/REFLEX      CANCELED: ANGIOTENSIN CONVERTING ENZYME      CANCELED: PROTEIN ELECTROPHORESIS      CANCELED: VITAMIN D, 25 HYDROXY      CANCELED: CK      CANCELED: HEAVY METALS PROFILE, UR Follow-up and Dispositions    Return in about 3 months (around 10/11/2022). Plan:  I will obtain EMG/nerve conduction study of all extremities on this patient to evaluate for neuropathy. Blood for autoimmune work-up, CK, aldolase, vitamin B12, ESR, protein serum electrophoresis  Due to the fact that patient works as a , I will obtain urine for heavy metal.  No medication at this time.   Signed:  Raegan Fernandez MD  7/11/2022

## 2022-07-11 NOTE — PROGRESS NOTES
1. Have you been to the ER, urgent care clinic since your last visit? Hospitalized since your last visit? No.    2. Have you seen or consulted any other health care providers outside of the [address] [place] [address] since your last visit? Include any pap smears or colon screening.    No.      Chief Complaint   Patient presents with    New Patient     neuropathy- numbness in rt leg, 3 toes on lf foot, numbness in lf forearm

## 2022-07-12 LAB
25(OH)D3 SERPL-MCNC: 36.6 NG/ML (ref 30–100)
CK SERPL-CCNC: 110 U/L (ref 39–308)
RHEUMATOID FACT SERPL-ACNC: <10 IU/ML (ref 0–15)
VIT B12 SERPL-MCNC: 358 PG/ML (ref 193–986)

## 2022-07-13 LAB
ACE SERPL-CCNC: 9 U/L (ref 14–82)
ALDOLASE SERPL-CCNC: 5.5 U/L (ref 3.3–10.3)
ANA SER QL: NEGATIVE

## 2022-07-15 LAB
ALBUMIN SERPL ELPH-MCNC: 4.2 G/DL (ref 2.9–4.4)
ALBUMIN/GLOB SERPL: 1.6 {RATIO} (ref 0.7–1.7)
ALPHA1 GLOB SERPL ELPH-MCNC: 0.2 G/DL (ref 0–0.4)
ALPHA2 GLOB SERPL ELPH-MCNC: 0.6 G/DL (ref 0.4–1)
B-GLOBULIN SERPL ELPH-MCNC: 1.2 G/DL (ref 0.7–1.3)
GAMMA GLOB SERPL ELPH-MCNC: 0.7 G/DL (ref 0.4–1.8)
GLOBULIN SER CALC-MCNC: 2.6 G/DL (ref 2.2–3.9)
M PROTEIN SERPL ELPH-MCNC: NORMAL G/DL
PROT SERPL-MCNC: 6.8 G/DL (ref 6–8.5)

## 2022-07-27 LAB
ARSENIC 24H UR-MCNC: 64 UG/L (ref 0–9)
COLLECT DURATION TIME UR: ABNORMAL HR
CREAT UR-MCNC: 1.89 G/L (ref 0.3–3)
INORG ARSENIC UR-MCNC: <10 UG/L
INORG ARSENIC/CREAT UR: 34 UG/G CREAT
LEAD 24H UR-MCNC: ABNORMAL UG/L (ref 0–49)
Lab: <20 UG/L
Lab: <5 UG/L
Lab: <5 UG/L
Lab: NORMAL
MERCURY 24H UR-MCNC: 3 UG/L (ref 0–19)
MERCURY/CREAT UR: 2 UG/G CREAT (ref 0–5)
SPECIMEN VOL ?TM UR: ABNORMAL ML

## 2022-08-16 ENCOUNTER — TELEPHONE (OUTPATIENT)
Dept: NEUROLOGY | Age: 62
End: 2022-08-16

## 2022-08-17 ENCOUNTER — TELEPHONE (OUTPATIENT)
Dept: NEUROLOGY | Age: 62
End: 2022-08-17

## 2022-08-17 DIAGNOSIS — Z77.010 CONTACT WITH AND (SUSPECTED) EXPOSURE TO ARSENIC: Primary | ICD-10-CM

## 2022-08-17 NOTE — TELEPHONE ENCOUNTER
I reviewed all of the patient's test results with him via telephone, noted that one of the test was abnormally high in his urine for arsenic, we have reviewed this and recommended retesting. I will mail the patient a lab slip he is to have this done shortly, advised no supplements prior to the test.  He denies any Pepto-Bismol use, he does consume 3-4 Tums a day, he is not on well water. He is a . He notes his right leg is still numb is giving him issues his left toes are starting to bother him now. This is all just very worrisome for the patient. He is scheduled for an EMG on September 13 hopefully that will give us more information, upon review of repeat testing will modify his plan of care based on results. Patient notes understanding appreciative for the call.

## 2022-09-13 ENCOUNTER — OFFICE VISIT (OUTPATIENT)
Dept: NEUROLOGY | Age: 62
End: 2022-09-13
Payer: COMMERCIAL

## 2022-09-13 DIAGNOSIS — G62.9 NEUROPATHY: Primary | ICD-10-CM

## 2022-09-13 DIAGNOSIS — G57.30 SUPERFICIAL PERONEAL NERVE NEUROPATHY, UNSPECIFIED LATERALITY: ICD-10-CM

## 2022-09-13 DIAGNOSIS — R20.2 PARESTHESIA: ICD-10-CM

## 2022-09-13 DIAGNOSIS — G56.23 ULNAR NEUROPATHY OF BOTH UPPER EXTREMITIES: ICD-10-CM

## 2022-09-13 PROCEDURE — 95886 MUSC TEST DONE W/N TEST COMP: CPT | Performed by: PSYCHIATRY & NEUROLOGY

## 2022-09-13 PROCEDURE — 95912 NRV CNDJ TEST 11-12 STUDIES: CPT | Performed by: PSYCHIATRY & NEUROLOGY

## 2022-09-13 NOTE — PROGRESS NOTES
[facility]  [address], [address] [facility], [address]  p: [phone]  f: [phone]    Test Date:  2022    Patient: Marycruz Borges : 1960 Physician:    Sex: Male Height:  cm Ref Phys:    ID#: [id number] Weight: 232 lbs. Technician:      Patient Complaints: Numbness and tingling sensation of the arms and legs, pain      Medications: See chart      Patient History / Exam: This is a 29-year-old right-handed white male who is being evaluated for numbness and tingling sensation of the extremities, pain, this has been going on for a while. NCV & EMG Findings:  Evaluation of the right ulnar motor nerve showed reduced amplitude (Wrist, 7.5 mV). The left Sup Fibular sensory nerve showed reduced amplitude (0.9 µV). The right Sup Fibular sensory nerve showed no response (14 cm). The left ulnar sensory nerve showed prolonged distal peak latency (4.3 ms). All remaining nerves (as indicated in the following tables) were within normal limits. Needle evaluation of the right deltoid muscle showed slightly increased spontaneous activity. All remaining muscles (as indicated in the following table) showed no evidence of electrical instability. Impression: There is electrodiagnostic evidence of bilateral axonal and sensorimotor ulnar neuropathy and bilateral axonal sensory superficial peroneal neuropathy, this consistent with compressive neuropathy, radiculopathy cannot be excluded.   Carpal tunnel syndrome cannot be excluded  Recommendations: Neuro imaging of lumbosacral spine suggested      ___________________________          Nerve Conduction Studies  Anti Sensory Summary Table     Stim Site NR Peak (ms) Norm Peak (ms) O-P Amp (µV) Norm O-P Amp Site1 Site2 Delta-0 (ms) Dist (cm) Ramsey (m/s) Norm Ramsey (m/s)   Left Median Anti Sensory (2nd Digit)  34°C   Wrist    3.9 <4.0 13.2 >11 Wrist 2nd Digit 3.3 14.0 42    Right Median Anti Sensory (2nd Digit) 34°C   Wrist    3.7 <4.0 11.8 >11 Wrist 2nd Digit 2.9 14.0 48    Left Sup Fibular Anti Sensory (Ant Lat Mall)  34°C   14 cm    3.0 <4.4 0.9 >6 14 cm Ant Lat Mall 2.8 10.0 36    Right Sup Fibular Anti Sensory (Ant Lat Mall)  34°C   14 cm NR  <4.4  >6 14 cm Ant Lat Mall  10.0     Left Sural Anti Sensory (Lat Mall)  34°C   Calf    2.4 <4.4 8.1 >6 Calf Lat Mall 1.7 14.0 82    Right Sural Anti Sensory (Lat Mall)  34°C   Calf    2.5 <4.4 8.2 >6 Calf Lat Mall 1.9 14.0 74    Left Ulnar Anti Sensory (5th Digit)  34°C   Wrist    4.3 <4.0 22.0 >10.0 Wrist 5th Digit 2.2 14.0 64    Right Ulnar Anti Sensory (5th Digit)  34°C   Wrist    3.5 <4.0 10.6 >10.0 Wrist 5th Digit 2.6 14.0 54      Motor Summary Table     Stim Site NR Onset (ms) Norm Onset (ms) O-P Amp (mV) Norm O-P Amp Site1 Site2 Delta-0 (ms) Dist (cm) Ramsey (m/s) Norm Ramsey (m/s)   Left Fibular Motor (Ext Dig Brev)  34°C   Ankle    4.9 <6.5 5.7 >1.3 B Fib Ankle 9.0 39.0 43 >38   B Fib    13.9  4.2  Poplt B Fib 1.8 10.0 56 >40   Poplt    15.7  4.0          Right Fibular Motor (Ext Dig Brev)  34°C   Ankle    3.6 <6.5 6.8 >1.3 B Fib Ankle 9.1 40.0 44 >38   B Fib    12.7  4.5  Poplt B Fib 1.9 10.0 53 >40   Poplt    14.6  4.4          Left Median Motor (Abd Poll Brev)  34°C   Wrist    4.1 <4.5 5.6 >4.1 Wrist Abd Poll Brev 4.1 8.0 20    Elbow    8.4  4.1  Elbow Wrist 4.3 26.0 60 >49   Right Median Motor (Abd Poll Brev)  34°C   Wrist    4.0 <4.5 5.3 >4.1 Wrist Abd Poll Brev 4.0 8.0 20    Elbow    9.1  5.3  Elbow Wrist 5.1 25.0 49 >49   Left Tibial Motor (Abd Booker Brev)  34°C   Ankle    3.6 <6.1 10.0 >4.4 Knee Ankle 11.2 45.0 40 >39   Knee    14.8  4.8          Right Tibial Motor (Abd Booker Brev)  34°C   Ankle    3.8 <6.1 5.2 >4.4 Knee Ankle 10.3 42.0 41 >39   Knee    14.1  2.6          Left Ulnar Motor (Abd Dig Min)  34°C   Wrist    3.4 <3.7 7.9 >7.9 Wrist Abd Dig Min 3.4 8.0 24    B Elbow    6.6  7.7 >6.0 B Elbow Wrist 3.2 24.0 75 >52   A Elbow    8.5  6.8 >6.0 A Elbow B Elbow 1.9 10.0 53 >43   Right Ulnar Motor (Abd Dig Min)  34°C   Wrist    2.4 <3.7 7.5 >7.9 Wrist Abd Dig Min 2.4 8.0 33    B Elbow    7.0  6.3 >6.0 B Elbow Wrist 4.6 24.0 52 >52   A Elbow    8.8  6.3 >6.0 A Elbow B Elbow 1.8 10.0 56 >43     EMG+     Side Muscle Nerve Root Ins Act Fibs Psw Amp Dur Poly Recrt Int Kennis Merck Comment   Right Abd Dig Min Ulnar C8-T1 Nml Nml Nml Nml Nml 0 Nml Nml    Right VastusMed Femoral L2-4 Nml Nml Nml Nml Nml 0 Nml Nml    Right VastusLat Femoral L2-4 Nml Nml Nml Nml Nml 0 Nml Nml    Right QuadratusFem QuadFemoris L4-5, S1 Nml Nml Nml Nml Nml 0 Nml Nml    Right AntTibialis Dp Br Fibular L4-5 Nml Nml Nml Nml Nml 0 Nml Nml    Right Fibularis Long Sup Br Fibular L5-S1 Nml Nml Nml Nml Nml 0 Nml Nml    Right Deltoid Axillary C5-6 Nml 1+ 1+ Nml Nml 0 Nml Nml    Right Biceps Musculocut C5-6 Nml Nml Nml Nml Nml 0 Nml Nml    Right Triceps Radial C6-7-8 Nml Nml Nml Nml Nml 0 Nml Nml    Right PronatorQuad Median (Ant Int) C7-8, Nml Nml Nml Nml Nml 0 Nml Nml        Nerve Conduction Studies  Anti Sensory Left/Right Comparison     Stim Site L Lat (ms) R Lat (ms) L-R Lat (ms) L Amp (µV) R Amp (µV) L-R Amp (%) Site1 Site2 L Ramsey (m/s) R Ramsey (m/s) L-R Ramsey (m/s)   Median Anti Sensory (2nd Digit)  34°C   Wrist 3.9 3.7 0.2 13.2 11.8 10.6 Wrist 2nd Digit 42 48 6   Sup Fibular Anti Sensory (Ant Lat Mall)  34°C   14 cm 3.0   0.9   14 cm Ant Lat Mall 36     Sural Anti Sensory (Lat Mall)  34°C   Calf 2.4 2.5 0.1 8.1 8.2 1.2 Calf Lat Mall 82 74 8   Ulnar Anti Sensory (5th Digit)  34°C   Wrist 4.3 3.5 0.8 22.0 10.6 51.8 Wrist 5th Digit 64 54 10     Motor Left/Right Comparison     Stim Site L Lat (ms) R Lat (ms) L-R Lat (ms) L Amp (mV) R Amp (mV) L-R Amp (%) Site1 Site2 L Ramsey (m/s) R Ramsey (m/s) L-R Ramsey (m/s)   Fibular Motor (Ext Dig Brev)  34°C   Ankle 4.9 3.6 1.3 5.7 6.8 16.2 B Fib Ankle 43 44 1   B Fib 13.9 12.7 1.2 4.2 4.5 6.7 Poplt B Fib 56 53 3   Poplt 15.7 14.6 1.1 4.0 4.4 9.1        Median Motor (Abd Poll Brev)  34°C Wrist 4.1 4.0 0.1 5.6 5.3 5.4 Wrist Abd Poll Brev 20 20 0   Elbow 8.4 9.1 0.7 4.1 5.3 22.6 Elbow Wrist 60 49 11   Tibial Motor (Abd Booker Brev)  34°C   Ankle 3.6 3.8 0.2 10.0 5.2 48.0 Knee Ankle 40 41 1   Knee 14.8 14.1 0.7 4.8 2.6 45.8        Ulnar Motor (Abd Dig Min)  34°C   Wrist 3.4 2.4 1.0 7.9 7.5 5.1 Wrist Abd Dig Min 24 33 9   B Elbow 6.6 7.0 0.4 7.7 6.3 18.2 B Elbow Wrist 75 52 23   A Elbow 8.5 8.8 0.3 6.8 6.3 7.4 A Elbow B Elbow 53 56 3         Waveforms:

## 2022-10-21 ENCOUNTER — OFFICE VISIT (OUTPATIENT)
Dept: NEUROLOGY | Age: 62
End: 2022-10-21
Payer: COMMERCIAL

## 2022-10-21 VITALS
HEIGHT: 72 IN | TEMPERATURE: 97.5 F | OXYGEN SATURATION: 98 % | DIASTOLIC BLOOD PRESSURE: 82 MMHG | WEIGHT: 228.6 LBS | HEART RATE: 82 BPM | SYSTOLIC BLOOD PRESSURE: 130 MMHG | RESPIRATION RATE: 18 BRPM | BODY MASS INDEX: 30.96 KG/M2

## 2022-10-21 DIAGNOSIS — M47.819 VERTEBRAL ARTHROPATHY: ICD-10-CM

## 2022-10-21 DIAGNOSIS — R20.2 PARESTHESIA: ICD-10-CM

## 2022-10-21 DIAGNOSIS — G62.9 POLYNEUROPATHY: Primary | ICD-10-CM

## 2022-10-21 DIAGNOSIS — G56.23 ULNAR NEUROPATHY OF BOTH UPPER EXTREMITIES: ICD-10-CM

## 2022-10-21 PROCEDURE — 99214 OFFICE O/P EST MOD 30 MIN: CPT | Performed by: PSYCHIATRY & NEUROLOGY

## 2022-10-21 RX ORDER — ALCOHOL 2.38 KG/3.79L
1 GEL TOPICAL 2 TIMES DAILY
Qty: 60 CAPSULE | Refills: 11 | Status: SHIPPED | OUTPATIENT
Start: 2022-10-21

## 2022-10-21 NOTE — PROGRESS NOTES
Neurology Progress Note    NAME:  Galen Leon   :   1960   MRN:   [id number]     Date/Time:  10/22/2022  Subjective:      Galen Leon is a 65 y.o. male here today for follow-up for neuropathy, test results. Patient says he is still experiencing the numbness and tingling sensation mostly in the feet. According to patient, he is more numb than tingling. He has however not noticed any weakness. EMG/nerve conduction study reviewed with patient showed electrodiagnostic evidence of bilateral axonal and sensorimotor ulnar neuropathy and bilateral axonal sensory superficial peroneal neuropathy, this consistent with compressive neuropathy, radiculopathy cannot be excluded. Carpal tunnel syndrome cannot be excluded  Recommendations: Neuro imaging of lumbosacral spine suggested  Blood work reviewed with patient showed elevated organic arsenic level 64, however inorganic was normal, this cannot explain the neuropathy.   Review of Systems - General ROS: positive for  - fatigue and sleep disturbance  Psychological ROS: positive for - anxiety, depression, and sleep disturbances  Ophthalmic ROS: positive for - blurry vision and decreased vision  ENT ROS: positive for - headaches, tinnitus, and visual changes  Allergy and Immunology ROS: negative  Hematological and Lymphatic ROS: negative  Endocrine ROS: negative  Respiratory ROS: no cough, shortness of breath, or wheezing  Cardiovascular ROS: no chest pain or dyspnea on exertion  Gastrointestinal ROS: no abdominal pain, change in bowel habits, or black or bloody stools  Genito-Urinary ROS: no dysuria, trouble voiding, or hematuria  Musculoskeletal ROS: positive for - joint pain and muscle pain  Neurological ROS: positive for - dizziness, numbness/tingling, visual changes, and weakness  Dermatological ROS: negative     Medications reviewed:  Current Outpatient Medications   Medication Sig Dispense Refill    levomefolate-B6-meB12-algal oil (Metanx, algal oil,) 3 mg-35 mg-2 mg -90.314 mg cap Take 1 Capsule by mouth two (2) times a day. 60 Capsule 11    lisinopril (PRINIVIL, ZESTRIL) 10 mg tablet Take 10 mg by mouth daily. 6    atorvastatin (LIPITOR) 40 mg tablet Take 40 mg by mouth daily. TESTOSTERONE CYPIONATE IM by IntraMUSCular route every seven (7) days. (Patient not taking: No sig reported)      CHORIONIC GONADOTROPIN, HUMAN IM by IntraMUSCular route two (2) times a week. (Patient not taking: No sig reported)      DULoxetine (CYMBALTA) 30 mg capsule Take 30 mg by mouth daily. (Patient not taking: No sig reported)  1    tamsulosin (FLOMAX) 0.4 mg capsule Take 0.4 mg by mouth daily. (Patient not taking: No sig reported)  0        Objective:   Vitals:  Vitals:    10/21/22 1005   BP: 130/82   Pulse: 82   Resp: 18   Temp: 97.5 °F (36.4 °C)   TempSrc: Temporal   SpO2: 98%   Weight: 228 lb 9.6 oz (103.7 kg)   Height: 6' (1.829 m)   PainSc:   0 - No pain               Lab Data Reviewed:  No results found for: WBC, HCT, HGB, PLT, HCTEXT, HGBEXT, PLTEXT    No results found for: NA, K, CL, CO2, GLU, BUN, CREA, CA    No components found for: TROPQUANT    No results found for: TRIXIE      Lab Results   Component Value Date/Time    Hemoglobin A1c 5.1 08/20/2020 04:13 PM        Lab Results   Component Value Date/Time    Vitamin B12 358 07/11/2022 03:45 PM       No results found for: TRIXIE, ANARX, ANAIGG, XBANA    No results found for: CHOL, CHOLPOCT, CHOLX, CHLST, CHOLV, HDL, HDLPOC, HDLP, LDL, LDLCPOC, LDLC, DLDLP, VLDLC, VLDL, TGLX, TRIGL, TRIGP, TGLPOCT, CHHD, CHHDX      CT Results (recent):  No results found for this or any previous visit. MRI Results (recent):  No results found for this or any previous visit. IR Results (recent):  No results found for this or any previous visit. VAS/US Results (recent):  No results found for this or any previous visit. PHYSICAL EXAM:  General:    Alert, cooperative, no distress, appears stated age.      Head:   Normocephalic, without obvious abnormality, atraumatic. Eyes:   Conjunctivae/corneas clear. PERRLA  Nose:  Nares normal. No drainage or sinus tenderness. Throat:    Lips, mucosa, and tongue normal.  No Thrush  Neck:  Supple, symmetrical,  no adenopathy, thyroid: non tender    no carotid bruit and no JVD. Back:    Symmetric,  No CVA tenderness. Lungs:   Clear to auscultation bilaterally. No Wheezing or Rhonchi. No rales. Chest wall:  No tenderness or deformity. No Accessory muscle use. Heart:   Regular rate and rhythm,  no murmur, rub or gallop. Abdomen:   Soft, non-tender. Not distended. Bowel sounds normal. No masses  Extremities: Extremities normal, atraumatic, No cyanosis. No edema. No clubbing  Skin:     Texture, turgor normal. No rashes or lesions. Not Jaundiced  Lymph nodes: Cervical, supraclavicular normal.  Psych:  Good insight. Not depressed. Not anxious or agitated. NEUROLOGICAL EXAM:  Appearance: The patient is well developed, well nourished, provides a coherent history and is in no acute distress. Mental Status: Oriented to time, place and person. Mood and affect appropriate. Cranial Nerves:   Intact visual fields. Fundi are benign. KOMAL, EOM's full, no nystagmus, no ptosis. Facial sensation is normal. Corneal reflexes are intact. Facial movement is symmetric. Hearing is normal bilaterally. Palate is midline with normal sternocleidomastoid and trapezius muscles are normal. Tongue is midline. Motor:  5/5 strength in upper and lower proximal and distal muscles. Normal bulk and tone. No fasciculations. Reflexes:   Deep tendon reflexes 2+/4 and symmetrical.   Sensory:   Decreased sensation to touch, pinprick and vibration medial aspect of the lower extremity up to the knee, medial aspect of the upper extremity up to the elbow. Gait:  Normal gait. Tremor:   No tremor noted. Cerebellar:  No cerebellar signs present.    Neurovascular:  Normal heart sounds and regular rhythm, peripheral pulses intact, and no carotid bruits. Assesment  1. Polyneuropathy    - REFERRAL TO PHYSICAL THERAPY    2. Ulnar neuropathy of both upper extremities [G56.23 (ICD-10-CM)]  Metanx    3. Paresthesia    - REFERRAL TO PHYSICAL THERAPY    4. Vertebral arthropathy    - REFERRAL TO PHYSICAL THERAPY  Will consider MRI of the lumbosacral spine.  ___________________________________________________  PLAN: Medication and plan discussed with patient      ICD-10-CM ICD-9-CM    1. Polyneuropathy  G62.9 356.9 REFERRAL TO PHYSICAL THERAPY      2. Ulnar neuropathy of both upper extremities [G56.23 (ICD-10-CM)]  G56.23 354.2       3. Paresthesia  R20.2 782.0 REFERRAL TO PHYSICAL THERAPY      4. Vertebral arthropathy  M47.819 721.90 REFERRAL TO PHYSICAL THERAPY        Follow-up and Dispositions    Return in about 6 months (around 4/21/2023).            t :    ___________________________________________________    Attending Physician: Felix Hannah MD

## 2022-10-21 NOTE — PROGRESS NOTES
Chief Complaint   Patient presents with    Results     EMG      1. Have you been to the ER, urgent care clinic since your last visit? Hospitalized since your last visit? No     2. Have you seen or consulted any other health care providers outside of the [address] [place] [address] since your last visit? Include any pap smears or colon screening.   No

## 2023-01-12 ENCOUNTER — OFFICE VISIT (OUTPATIENT)
Dept: NEUROLOGY | Age: 63
End: 2023-01-12
Payer: COMMERCIAL

## 2023-01-12 VITALS
HEIGHT: 72 IN | RESPIRATION RATE: 18 BRPM | BODY MASS INDEX: 30.88 KG/M2 | DIASTOLIC BLOOD PRESSURE: 60 MMHG | WEIGHT: 228 LBS | HEART RATE: 76 BPM | TEMPERATURE: 98 F | OXYGEN SATURATION: 98 % | SYSTOLIC BLOOD PRESSURE: 108 MMHG

## 2023-01-12 DIAGNOSIS — G56.22 ULNAR NEUROPATHY AT ELBOW OF LEFT UPPER EXTREMITY: ICD-10-CM

## 2023-01-12 DIAGNOSIS — G60.8 IDIOPATHIC SMALL AND LARGE FIBER SENSORY NEUROPATHY: Primary | ICD-10-CM

## 2023-01-12 DIAGNOSIS — G56.21 ULNAR NEUROPATHY AT ELBOW OF RIGHT UPPER EXTREMITY: ICD-10-CM

## 2023-01-12 DIAGNOSIS — G63 IMMUNE-MEDIATED NEUROPATHY (HCC): ICD-10-CM

## 2023-01-12 DIAGNOSIS — G37.9 DEMYELINATING DISEASE OF CENTRAL NERVOUS SYSTEM (HCC): ICD-10-CM

## 2023-01-12 DIAGNOSIS — E11.42 DIABETIC PERIPHERAL NEUROPATHY ASSOCIATED WITH TYPE 2 DIABETES MELLITUS (HCC): ICD-10-CM

## 2023-01-12 DIAGNOSIS — M47.22 CERVICAL RADICULOPATHY DUE TO DEGENERATIVE JOINT DISEASE OF SPINE: ICD-10-CM

## 2023-01-12 DIAGNOSIS — M47.27 LUMBOSACRAL RADICULOPATHY DUE TO DEGENERATIVE JOINT DISEASE OF SPINE: ICD-10-CM

## 2023-01-12 DIAGNOSIS — E53.8 B12 DEFICIENCY: ICD-10-CM

## 2023-01-12 DIAGNOSIS — G56.03 BILATERAL CARPAL TUNNEL SYNDROME: ICD-10-CM

## 2023-01-12 DIAGNOSIS — D89.89 IMMUNE-MEDIATED NEUROPATHY (HCC): ICD-10-CM

## 2023-01-12 PROCEDURE — 99215 OFFICE O/P EST HI 40 MIN: CPT | Performed by: PSYCHIATRY & NEUROLOGY

## 2023-01-12 NOTE — LETTER
1/12/2023    Patient: Carlos A Wood   YOB: 1960   Date of Visit: 1/12/2023     Ed Kumar MD  [id number] [id number]  Via Fax: [phone]    Dear Ed Kumar MD,      Thank you for referring Mr. Carlos A Wood to [address] [place] [address] for evaluation. My notes for this consultation are attached. Consult  REFERRED BY:  Sherry Carr MD    CHIEF COMPLAINT: Numbness in his feet, and both arms of unclear etiology      Subjective:     Carlos A Wood is a 65 y.o. right-handed  male we are seeing as a new patient today, for evaluation of progressive numbness in his feet, radiating down the right side of his leg from the hip down, some mild back pain and numbness in both arms, and numbness in both hands worse in the morning he wakes up, and generalized weakness and fatigue. The patient has been followed by neurology for 5 years for some of these complaints, and is seen at least 3 neurologists in the last 5 years for the same problems. He had an EMG study apparently done in October 2022 by Dr. Noemí Loya that suggested that he had a length dependent sensory motor neuropathy and possible bilateral ulnar neuropathies at the elbows and possibly bilateral carpal tunnel syndromes, but none of these were definitive in the report itself cannot be found. He had urine heavy metal screen done that showed positive for arsenic, but then when he had the toxic arsenic levels rechecked they were apparently all negative. He has had a lot of neck pain and back pain but never had any imaging done of his spine. He has no family history of similar problems. He is not a diabetic and had normal B12 levels all the rest of this neuropathy panels were negative in the past.  He has had some questionable TIAs in the past but completely normal work-up described in the chart but none of the tests are really available.   He does have high blood pressure, kidney stones, arthritis, ringing in his ears, sleep apnea, and some type of visual disturbance that the eye doctors cannot identify. He comes in today for further evaluation. He has had no unusual fever, toxin exposure, major trauma, is not a diabetic, and has no other evidence of autoimmune disease. He does take multivitamins and vitamin D on a regular basis. He takes Lipitor for his cholesterol and Zestril for his blood pressure. He does not appear to have any family history of this. He complains his left arm is weaker than his right and number. Past Medical History:   Diagnosis Date    Arthritis     High blood pressure     Ill-defined condition     kidney stones    Joint pain     Migraine     Muscle weakness     Ringing in the ears     Snoring     Visual disturbance       Past Surgical History:   Procedure Laterality Date    HX OTHER SURGICAL      inguinal hernia     Family History   Problem Relation Age of Onset    Stroke Mother     Colon Cancer Brother     Heart Disease Father       Social History     Tobacco Use    Smoking status: Former     Packs/day: 2.00     Years: 30.00     Pack years: 60.00     Types: Cigarettes     Quit date:      Years since quittin.0    Smokeless tobacco: Never   Substance Use Topics    Alcohol use: Yes     Comment: rare         Current Outpatient Medications:     lisinopril (PRINIVIL, ZESTRIL) 10 mg tablet, Take 10 mg by mouth daily. , Disp: , Rfl: 6    atorvastatin (LIPITOR) 40 mg tablet, Take 40 mg by mouth daily. , Disp: , Rfl:     levomefolate-B6-meB12-algal oil (Metanx, algal oil,) 3 mg-35 mg-2 mg -90.314 mg cap, Take 1 Capsule by mouth two (2) times a day. (Patient not taking: Reported on 2023), Disp: 60 Capsule, Rfl: 11    TESTOSTERONE CYPIONATE IM, by IntraMUSCular route every seven (7) days. (Patient not taking: No sig reported), Disp: , Rfl:     CHORIONIC GONADOTROPIN, HUMAN IM, by IntraMUSCular route two (2) times a week.  (Patient not taking: No sig reported), Disp: , Rfl:     DULoxetine (CYMBALTA) 30 mg capsule, Take 30 mg by mouth daily. (Patient not taking: No sig reported), Disp: , Rfl: 1    tamsulosin (FLOMAX) 0.4 mg capsule, Take 0.4 mg by mouth daily. (Patient not taking: No sig reported), Disp: , Rfl: 0        No Known Allergies   MRI Results (most recent):  No results found for this or any previous visit. No results found for this or any previous visit. Review of Systems:  A comprehensive review of systems was negative except for: Constitutional: positive for fatigue and malaise  Eyes: positive for visual disturbance  Ears, nose, mouth, throat, and face: positive for hearing loss  Musculoskeletal: positive for myalgias, arthralgias, stiff joints, neck pain, back pain, and muscle weakness  Neurological: positive for paresthesia, coordination problems, gait problems, and weakness  Behvioral/Psych: positive for anxiety and depression   Vitals:    01/12/23 1545   BP: 108/60   Pulse: 76   Resp: 18   Temp: 98 °F (36.7 °C)   SpO2: 98%   Weight: 228 lb (103.4 kg)   Height: 6' (1.829 m)     Objective:     I      NEUROLOGICAL EXAM:    Appearance: The patient is well developed, well nourished, provides a coherent history and is in no acute distress. Mental Status: Oriented to time, place and person, and the president, cognitive function is normal and speech is fluent and no aphasia or dysarthria. Mood and affect appropriate but anxious. Cranial Nerves:   Intact visual fields. Fundi are benign, disc are flat, no lesions seen on funduscopy. KOMAL, EOM's full, no nystagmus, no ptosis. Facial sensation is normal. Corneal reflexes are not tested. Facial movement is symmetric. Hearing is mildly abnormal bilaterally. Palate is midline with normal sternocleidomastoid and trapezius muscles are normal. Tongue is midline.   Neck without meningismus or bruits  Temporal arteries are not tender or enlarged  TMJ areas are not tender on palpation Motor:  5/5 strength in upper and lower proximal and distal muscles, though the left arm may be a trace weaker than the right. Normal bulk and tone, except that the left arm has less bulk in the right. No fasciculations. Rapid alternating movement is symmetric and intact bilaterally   Reflexes:   Deep tendon reflexes 2+/4 in the right arm and leg, but absent ankle jerks bilaterally and normal knee jerk on the left, but decreased reflexes in the left upper extremity particular the biceps and brachial radialis. No babinski or clonus present  Patient has Tinel's over both median nerves at the wrist and both ulnar nerves at the elbow. Sensory:   Abnormal to touch, pinprick and vibration and temperature in both feet to about mid calf level, and along the anterior lateral right leg all the way down to the foot. .  DSS is intact   Gait:  Normal gait for patient's age essentially, though he does move a little slowly due to arthritis. Tremor:   No tremor noted. Cerebellar:  Mildly abnormal cerebellar signs present on Romberg and tandem testing and finger-nose-finger exam.   Neurovascular:  Normal heart sounds and regular rhythm, peripheral pulses decreased, and no carotid bruits. Assessment:       ICD-10-CM ICD-9-CM    1. Idiopathic small and large fiber sensory neuropathy  G60.8 356.4 PROTEIN, CSF      ANTINEURONAL CELL AB      TRIXIE COMPREHENSIVE PLUS PANEL      CK      GM1 IGG AUTOANTIBODY      IMMUNOELECTROPHORESIS (IMMUNOFIX.)      VITAMIN B12 & FOLATE      MYELIN ASSOC. GLYCOPROTEIN AB,IGM      MRI CERV SPINE W WO CONT      MRI LUMB SPINE WO CONT      HEAVY METALS PROFILE I, BLOOD      EMG LIMITED      2. Immune-mediated neuropathy (HCC)  D89.89 279.8 PROTEIN, CSF    G63 357.4 ANTINEURONAL CELL AB      TRIXIE COMPREHENSIVE PLUS PANEL      CK      GM1 IGG AUTOANTIBODY      IMMUNOELECTROPHORESIS (IMMUNOFIX.)      VITAMIN B12 & FOLATE      MYELIN ASSOC.  GLYCOPROTEIN AB,IGM      MRI CERV SPINE W WO CONT MRI LUMB SPINE WO CONT      HEAVY METALS PROFILE I, BLOOD      EMG LIMITED      3. Lumbosacral radiculopathy due to degenerative joint disease of spine  M47.27 722.52 PROTEIN, CSF      ANTINEURONAL CELL AB      TRIXIE COMPREHENSIVE PLUS PANEL      CK      GM1 IGG AUTOANTIBODY      IMMUNOELECTROPHORESIS (IMMUNOFIX.)      VITAMIN B12 & FOLATE      MYELIN ASSOC. GLYCOPROTEIN AB,IGM      MRI CERV SPINE W WO CONT      MRI LUMB SPINE WO CONT      HEAVY METALS PROFILE I, BLOOD      EMG LIMITED      4. Cervical radiculopathy due to degenerative joint disease of spine  M47.22 721.0 PROTEIN, CSF      ANTINEURONAL CELL AB      TRIXIE COMPREHENSIVE PLUS PANEL      CK      GM1 IGG AUTOANTIBODY      IMMUNOELECTROPHORESIS (IMMUNOFIX.)      VITAMIN B12 & FOLATE      MYELIN ASSOC. GLYCOPROTEIN AB,IGM      MRI CERV SPINE W WO CONT      MRI LUMB SPINE WO CONT      HEAVY METALS PROFILE I, BLOOD      EMG LIMITED      5. Bilateral carpal tunnel syndrome  G56.03 354.0 PROTEIN, CSF      ANTINEURONAL CELL AB      TRIXIE COMPREHENSIVE PLUS PANEL      CK      GM1 IGG AUTOANTIBODY      IMMUNOELECTROPHORESIS (IMMUNOFIX.)      VITAMIN B12 & FOLATE      MYELIN ASSOC. GLYCOPROTEIN AB,IGM      MRI CERV SPINE W WO CONT      MRI LUMB SPINE WO CONT      HEAVY METALS PROFILE I, BLOOD      EMG LIMITED      6. Ulnar neuropathy at elbow of left upper extremity  G56.22 354.2 PROTEIN, CSF      ANTINEURONAL CELL AB      TRIXIE COMPREHENSIVE PLUS PANEL      CK      GM1 IGG AUTOANTIBODY      IMMUNOELECTROPHORESIS (IMMUNOFIX.)      VITAMIN B12 & FOLATE      MYELIN ASSOC. GLYCOPROTEIN AB,IGM      MRI CERV SPINE W WO CONT      MRI LUMB SPINE WO CONT      HEAVY METALS PROFILE I, BLOOD      EMG LIMITED      7. Ulnar neuropathy at elbow of right upper extremity  G56.21 354.2 PROTEIN, CSF      ANTINEURONAL CELL AB      TRIXIE COMPREHENSIVE PLUS PANEL      CK      GM1 IGG AUTOANTIBODY      IMMUNOELECTROPHORESIS (IMMUNOFIX.)      VITAMIN B12 & FOLATE      MYELIN ASSOC.  GLYCOPROTEIN AB,IGM      MRI CERV SPINE W WO CONT      MRI LUMB SPINE WO CONT      HEAVY METALS PROFILE I, BLOOD      EMG LIMITED      8. Demyelinating disease of central nervous system (HCC)  G37.9 341.9 PROTEIN, CSF      ANTINEURONAL CELL AB      TRIXIE COMPREHENSIVE PLUS PANEL      CK      GM1 IGG AUTOANTIBODY      IMMUNOELECTROPHORESIS (IMMUNOFIX.)      VITAMIN B12 & FOLATE      MYELIN ASSOC. GLYCOPROTEIN AB,IGM      MRI CERV SPINE W WO CONT      MRI LUMB SPINE WO CONT      HEAVY METALS PROFILE I, BLOOD      EMG LIMITED      9. Diabetic peripheral neuropathy associated with type 2 diabetes mellitus (HCC)  E11.42 250.60 PROTEIN, CSF     357.2 ANTINEURONAL CELL AB      TRIXIE COMPREHENSIVE PLUS PANEL      CK      GM1 IGG AUTOANTIBODY      IMMUNOELECTROPHORESIS (IMMUNOFIX.)      VITAMIN B12 & FOLATE      MYELIN ASSOC. GLYCOPROTEIN AB,IGM      MRI CERV SPINE W WO CONT      MRI LUMB SPINE WO CONT      HEAVY METALS PROFILE I, BLOOD      HEMOGLOBIN A1C WITH EAG      EMG LIMITED      10. B12 deficiency  E53.8 266.2 VITAMIN B12 & FOLATE        Active Problems:    * No active hospital problems. *      Plan:     Very unusual case of patient having multiple neurological symptoms, and there probably are several diagnoses here. 1.  It appears that he has bilateral carpal tunnel syndromes with Tinel's over the median nerves at the wrist bilaterally and probably bilateral ulnar neuropathies and needs repeat EMG study to document all this. He was advised to try to keep pressure off his elbows in the interim we obtain these test.  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   He seems to have some slight atrophy and decreased reflexes in his left upper extremity raises the possibility of a cervical radiculopathy and may be a myelopathy as a cause of some of his symptoms, he needs an MRI of the neck, and need to do it with contrast just to make sure there is no demyelinating disease or other unusual problems like syrinx, Arnold-Chiari malformation, or tumor or AVM. 4.  He may have a lumbar radiculopathy which is asymmetric numbness particularly down his right leg that may be L4-L5 root distribution or could have a cauda equina syndrome or spinal stenosis. 5.  Patient probably has a length dependent demyelinating and axonal polyneuropathy and needs characterization and needs repeat EMG for that multiple metabolic panels were sent off to rule out any other treatable cause of these. He had an abnormal urine heavy metal screen with positivity for arsenic, but then repeat showed pathologic arsenic levels were unremarkable, still somewhat peculiar so we will recheck that again he has no well water or any source for arsenic. We will check metabolic panels for all causes of neuropathy and he may need genetic testing or neurologic testing for paraneoplastic disease eventually also. Very difficult case, all history was reviewed in detail, the last 5 years of his neurologic work-up and testing, and showed his diagnosis and diagnosed with the patient in detail, and 42 minutes spent with the patient during all this today. Prognosis is very guarded here, he is getting worse with time and may end up being disabled and debilitated if a treatable cause is not found soon. Signed By: Xavier Adams MD     January 12, 2023       CC: Flakito Lau MD  FAX: [phone]        If you have questions, please do not hesitate to call me. I look forward to following your patient along with you.       Sincerely,    Xavier Adams MD

## 2023-01-13 NOTE — PROGRESS NOTES
Consult  REFERRED BY:  Savanna Mon MD    CHIEF COMPLAINT: Numbness in his feet, and both arms of unclear etiology      Subjective:     Linden Suresh is a 65 y.o. right-handed  male we are seeing as a new patient today, for evaluation of progressive numbness in his feet, radiating down the right side of his leg from the hip down, some mild back pain and numbness in both arms, and numbness in both hands worse in the morning he wakes up, and generalized weakness and fatigue. The patient has been followed by neurology for 5 years for some of these complaints, and is seen at least 3 neurologists in the last 5 years for the same problems. He had an EMG study apparently done in October 2022 by Dr. Ricardo Baker that suggested that he had a length dependent sensory motor neuropathy and possible bilateral ulnar neuropathies at the elbows and possibly bilateral carpal tunnel syndromes, but none of these were definitive in the report itself cannot be found. He had urine heavy metal screen done that showed positive for arsenic, but then when he had the toxic arsenic levels rechecked they were apparently all negative. He has had a lot of neck pain and back pain but never had any imaging done of his spine. He has no family history of similar problems. He is not a diabetic and had normal B12 levels all the rest of this neuropathy panels were negative in the past.  He has had some questionable TIAs in the past but completely normal work-up described in the chart but none of the tests are really available. He does have high blood pressure, kidney stones, arthritis, ringing in his ears, sleep apnea, and some type of visual disturbance that the eye doctors cannot identify. He comes in today for further evaluation. He has had no unusual fever, toxin exposure, major trauma, is not a diabetic, and has no other evidence of autoimmune disease. He does take multivitamins and vitamin D on a regular basis.   He takes Lipitor for his cholesterol and Zestril for his blood pressure. He does not appear to have any family history of this. He complains his left arm is weaker than his right and number. Past Medical History:   Diagnosis Date    Arthritis     High blood pressure     Ill-defined condition     kidney stones    Joint pain     Migraine     Muscle weakness     Ringing in the ears     Snoring     Visual disturbance       Past Surgical History:   Procedure Laterality Date    HX OTHER SURGICAL      inguinal hernia     Family History   Problem Relation Age of Onset    Stroke Mother     Colon Cancer Brother     Heart Disease Father       Social History     Tobacco Use    Smoking status: Former     Packs/day: 2.00     Years: 30.00     Pack years: 60.00     Types: Cigarettes     Quit date:      Years since quittin.0    Smokeless tobacco: Never   Substance Use Topics    Alcohol use: Yes     Comment: rare         Current Outpatient Medications:     lisinopril (PRINIVIL, ZESTRIL) 10 mg tablet, Take 10 mg by mouth daily. , Disp: , Rfl: 6    atorvastatin (LIPITOR) 40 mg tablet, Take 40 mg by mouth daily. , Disp: , Rfl:     levomefolate-B6-meB12-algal oil (Metanx, algal oil,) 3 mg-35 mg-2 mg -90.314 mg cap, Take 1 Capsule by mouth two (2) times a day. (Patient not taking: Reported on 2023), Disp: 60 Capsule, Rfl: 11    TESTOSTERONE CYPIONATE IM, by IntraMUSCular route every seven (7) days. (Patient not taking: No sig reported), Disp: , Rfl:     CHORIONIC GONADOTROPIN, HUMAN IM, by IntraMUSCular route two (2) times a week. (Patient not taking: No sig reported), Disp: , Rfl:     DULoxetine (CYMBALTA) 30 mg capsule, Take 30 mg by mouth daily. (Patient not taking: No sig reported), Disp: , Rfl: 1    tamsulosin (FLOMAX) 0.4 mg capsule, Take 0.4 mg by mouth daily. (Patient not taking: No sig reported), Disp: , Rfl: 0        No Known Allergies   MRI Results (most recent):  No results found for this or any previous visit.       No results found for this or any previous visit. Review of Systems:  A comprehensive review of systems was negative except for: Constitutional: positive for fatigue and malaise  Eyes: positive for visual disturbance  Ears, nose, mouth, throat, and face: positive for hearing loss  Musculoskeletal: positive for myalgias, arthralgias, stiff joints, neck pain, back pain, and muscle weakness  Neurological: positive for paresthesia, coordination problems, gait problems, and weakness  Behvioral/Psych: positive for anxiety and depression   Vitals:    01/12/23 1545   BP: 108/60   Pulse: 76   Resp: 18   Temp: 98 °F (36.7 °C)   SpO2: 98%   Weight: 228 lb (103.4 kg)   Height: 6' (1.829 m)     Objective:     I      NEUROLOGICAL EXAM:    Appearance: The patient is well developed, well nourished, provides a coherent history and is in no acute distress. Mental Status: Oriented to time, place and person, and the president, cognitive function is normal and speech is fluent and no aphasia or dysarthria. Mood and affect appropriate but anxious. Cranial Nerves:   Intact visual fields. Fundi are benign, disc are flat, no lesions seen on funduscopy. KOMAL, EOM's full, no nystagmus, no ptosis. Facial sensation is normal. Corneal reflexes are not tested. Facial movement is symmetric. Hearing is mildly abnormal bilaterally. Palate is midline with normal sternocleidomastoid and trapezius muscles are normal. Tongue is midline. Neck without meningismus or bruits  Temporal arteries are not tender or enlarged  TMJ areas are not tender on palpation   Motor:  5/5 strength in upper and lower proximal and distal muscles, though the left arm may be a trace weaker than the right. Normal bulk and tone, except that the left arm has less bulk in the right. No fasciculations.   Rapid alternating movement is symmetric and intact bilaterally   Reflexes:   Deep tendon reflexes 2+/4 in the right arm and leg, but absent ankle jerks bilaterally and normal knee jerk on the left, but decreased reflexes in the left upper extremity particular the biceps and brachial radialis. No babinski or clonus present  Patient has Tinel's over both median nerves at the wrist and both ulnar nerves at the elbow. Sensory:   Abnormal to touch, pinprick and vibration and temperature in both feet to about mid calf level, and along the anterior lateral right leg all the way down to the foot. .  DSS is intact   Gait:  Normal gait for patient's age essentially, though he does move a little slowly due to arthritis. Tremor:   No tremor noted. Cerebellar:  Mildly abnormal cerebellar signs present on Romberg and tandem testing and finger-nose-finger exam.   Neurovascular:  Normal heart sounds and regular rhythm, peripheral pulses decreased, and no carotid bruits. Assessment:       ICD-10-CM ICD-9-CM    1. Idiopathic small and large fiber sensory neuropathy  G60.8 356.4 PROTEIN, CSF      ANTINEURONAL CELL AB      TRIXIE COMPREHENSIVE PLUS PANEL      CK      GM1 IGG AUTOANTIBODY      IMMUNOELECTROPHORESIS (IMMUNOFIX.)      VITAMIN B12 & FOLATE      MYELIN ASSOC. GLYCOPROTEIN AB,IGM      MRI CERV SPINE W WO CONT      MRI LUMB SPINE WO CONT      HEAVY METALS PROFILE I, BLOOD      EMG LIMITED      2. Immune-mediated neuropathy (HCC)  D89.89 279.8 PROTEIN, CSF    G63 357.4 ANTINEURONAL CELL AB      TRIXIE COMPREHENSIVE PLUS PANEL      CK      GM1 IGG AUTOANTIBODY      IMMUNOELECTROPHORESIS (IMMUNOFIX.)      VITAMIN B12 & FOLATE      MYELIN ASSOC. GLYCOPROTEIN AB,IGM      MRI CERV SPINE W WO CONT      MRI LUMB SPINE WO CONT      HEAVY METALS PROFILE I, BLOOD      EMG LIMITED      3. Lumbosacral radiculopathy due to degenerative joint disease of spine  M47.27 722.52 PROTEIN, CSF      ANTINEURONAL CELL AB      TRIXIE COMPREHENSIVE PLUS PANEL      CK      GM1 IGG AUTOANTIBODY      IMMUNOELECTROPHORESIS (IMMUNOFIX.)      VITAMIN B12 & FOLATE      MYELIN ASSOC.  GLYCOPROTEIN AB,IGM      MRI CERV SPINE W WO CONT      MRI LUMB SPINE WO CONT      HEAVY METALS PROFILE I, BLOOD      EMG LIMITED      4. Cervical radiculopathy due to degenerative joint disease of spine  M47.22 721.0 PROTEIN, CSF      ANTINEURONAL CELL AB      TRIXIE COMPREHENSIVE PLUS PANEL      CK      GM1 IGG AUTOANTIBODY      IMMUNOELECTROPHORESIS (IMMUNOFIX.)      VITAMIN B12 & FOLATE      MYELIN ASSOC. GLYCOPROTEIN AB,IGM      MRI CERV SPINE W WO CONT      MRI LUMB SPINE WO CONT      HEAVY METALS PROFILE I, BLOOD      EMG LIMITED      5. Bilateral carpal tunnel syndrome  G56.03 354.0 PROTEIN, CSF      ANTINEURONAL CELL AB      TRIXIE COMPREHENSIVE PLUS PANEL      CK      GM1 IGG AUTOANTIBODY      IMMUNOELECTROPHORESIS (IMMUNOFIX.)      VITAMIN B12 & FOLATE      MYELIN ASSOC. GLYCOPROTEIN AB,IGM      MRI CERV SPINE W WO CONT      MRI LUMB SPINE WO CONT      HEAVY METALS PROFILE I, BLOOD      EMG LIMITED      6. Ulnar neuropathy at elbow of left upper extremity  G56.22 354.2 PROTEIN, CSF      ANTINEURONAL CELL AB      TRIXIE COMPREHENSIVE PLUS PANEL      CK      GM1 IGG AUTOANTIBODY      IMMUNOELECTROPHORESIS (IMMUNOFIX.)      VITAMIN B12 & FOLATE      MYELIN ASSOC. GLYCOPROTEIN AB,IGM      MRI CERV SPINE W WO CONT      MRI LUMB SPINE WO CONT      HEAVY METALS PROFILE I, BLOOD      EMG LIMITED      7. Ulnar neuropathy at elbow of right upper extremity  G56.21 354.2 PROTEIN, CSF      ANTINEURONAL CELL AB      TRIXIE COMPREHENSIVE PLUS PANEL      CK      GM1 IGG AUTOANTIBODY      IMMUNOELECTROPHORESIS (IMMUNOFIX.)      VITAMIN B12 & FOLATE      MYELIN ASSOC. GLYCOPROTEIN AB,IGM      MRI CERV SPINE W WO CONT      MRI LUMB SPINE WO CONT      HEAVY METALS PROFILE I, BLOOD      EMG LIMITED      8. Demyelinating disease of central nervous system (HCC)  G37.9 341.9 PROTEIN, CSF      ANTINEURONAL CELL AB      TRIXIE COMPREHENSIVE PLUS PANEL      CK      GM1 IGG AUTOANTIBODY      IMMUNOELECTROPHORESIS (IMMUNOFIX.)      VITAMIN B12 & FOLATE      MYELIN ASSOC. GLYCOPROTEIN AB,IGM      MRI CERV SPINE W WO CONT      MRI LUMB SPINE WO CONT      HEAVY METALS PROFILE I, BLOOD      EMG LIMITED      9. Diabetic peripheral neuropathy associated with type 2 diabetes mellitus (HCC)  E11.42 250.60 PROTEIN, CSF     357.2 ANTINEURONAL CELL AB      TRIXIE COMPREHENSIVE PLUS PANEL      CK      GM1 IGG AUTOANTIBODY      IMMUNOELECTROPHORESIS (IMMUNOFIX.)      VITAMIN B12 & FOLATE      MYELIN ASSOC. GLYCOPROTEIN AB,IGM      MRI CERV SPINE W WO CONT      MRI LUMB SPINE WO CONT      HEAVY METALS PROFILE I, BLOOD      HEMOGLOBIN A1C WITH EAG      EMG LIMITED      10. B12 deficiency  E53.8 266.2 VITAMIN B12 & FOLATE        Active Problems:    * No active hospital problems. *      Plan:     Very unusual case of patient having multiple neurological symptoms, and there probably are several diagnoses here. 1.  It appears that he has bilateral carpal tunnel syndromes with Tinel's over the median nerves at the wrist bilaterally and probably bilateral ulnar neuropathies and needs repeat EMG study to document all this. He was advised to try to keep pressure off his elbows in the interim we obtain these test.  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 He seems to have some slight atrophy and decreased reflexes in his left upper extremity raises the possibility of a cervical radiculopathy and may be a myelopathy as a cause of some of his symptoms, he needs an MRI of the neck, and need to do it with contrast just to make sure there is no demyelinating disease or other unusual problems like syrinx, Arnold-Chiari malformation, or tumor or AVM. 4.  He may have a lumbar radiculopathy which is asymmetric numbness particularly down his right leg that may be L4-L5 root distribution or could have a cauda equina syndrome or spinal stenosis.   5.  Patient probably has a length dependent demyelinating and axonal polyneuropathy and needs characterization and needs repeat EMG for that multiple metabolic panels were sent off to rule out any other treatable cause of these. He had an abnormal urine heavy metal screen with positivity for arsenic, but then repeat showed pathologic arsenic levels were unremarkable, still somewhat peculiar so we will recheck that again he has no well water or any source for arsenic. We will check metabolic panels for all causes of neuropathy and he may need genetic testing or neurologic testing for paraneoplastic disease eventually also. Very difficult case, all history was reviewed in detail, the last 5 years of his neurologic work-up and testing, and showed his diagnosis and diagnosed with the patient in detail, and 42 minutes spent with the patient during all this today. Prognosis is very guarded here, he is getting worse with time and may end up being disabled and debilitated if a treatable cause is not found soon.   Signed By: Charli Santoro MD     January 12, 2023       CC: Matias Sawyer MD  FAX: [phone]

## 2023-01-20 ENCOUNTER — TELEPHONE (OUTPATIENT)
Dept: NEUROLOGY | Age: 63
End: 2023-01-20

## 2023-01-20 NOTE — TELEPHONE ENCOUNTER
Char hernadez/ Tameka Lees, Dr Samson Doshi office, called stating that Pt's wife had called them to schedule an EMG. They scheduled Pt for 2/14 and wanted to know if Pt was scheduled for an EMG with us. I advised Akshat Force that Pt is scheduled for an EMG with us for 3/8 and that I would put a message in to Dr Jeanine Jackson and his Nurse to let her know if Pt can keep the EMG scheduled for 2/14 since it is earlier than the one we have him scheduled for.  Please call [phone]

## 2023-01-21 NOTE — TELEPHONE ENCOUNTER
Please advise. Jose Martínez scheduled him for EMG too, sooner than ours. Telephone Encounter by Magda Alfred NCMA at 12/07/17 01:31 PM     Author:  Magda Alfred NCMA Service:  (none) Author Type:  Certified Medical Assistant     Filed:  12/07/17 01:32 PM Encounter Date:  12/6/2017 Status:  Signed     :  Magda Alfred NCMA (Certified Medical Assistant)            Please contact patient and help schedule first available NPT with Dr. Liu for urinary frequency.  Thanks.[CF1.1M]      Revision History        User Key Date/Time User Provider Type Action    > CF1.1 12/07/17 01:32 PM Magda Alfred NCMA Certified Medical Assistant Sign    M - Manual

## 2023-01-23 NOTE — TELEPHONE ENCOUNTER
[facility] to call me. Advised  Dr. Lakisha Adams prefers he keep his EMG here,and not at [facility]. States it will be somewhat complicated and wants it done here. Pt needs to cancel EMG at [facility].

## 2023-01-24 ENCOUNTER — HOSPITAL ENCOUNTER (OUTPATIENT)
Dept: MRI IMAGING | Age: 63
Discharge: HOME | End: 2023-01-24
Attending: PSYCHIATRY & NEUROLOGY
Payer: COMMERCIAL

## 2023-01-24 DIAGNOSIS — G56.21 ULNAR NEUROPATHY AT ELBOW OF RIGHT UPPER EXTREMITY: ICD-10-CM

## 2023-01-24 DIAGNOSIS — G56.22 ULNAR NEUROPATHY AT ELBOW OF LEFT UPPER EXTREMITY: ICD-10-CM

## 2023-01-24 DIAGNOSIS — E11.42 DIABETIC PERIPHERAL NEUROPATHY ASSOCIATED WITH TYPE 2 DIABETES MELLITUS (HCC): ICD-10-CM

## 2023-01-24 DIAGNOSIS — G37.9 DEMYELINATING DISEASE OF CENTRAL NERVOUS SYSTEM (HCC): ICD-10-CM

## 2023-01-24 DIAGNOSIS — D89.89 IMMUNE-MEDIATED NEUROPATHY (HCC): ICD-10-CM

## 2023-01-24 DIAGNOSIS — G63 IMMUNE-MEDIATED NEUROPATHY (HCC): ICD-10-CM

## 2023-01-24 DIAGNOSIS — G56.03 BILATERAL CARPAL TUNNEL SYNDROME: ICD-10-CM

## 2023-01-24 DIAGNOSIS — M47.22 CERVICAL RADICULOPATHY DUE TO DEGENERATIVE JOINT DISEASE OF SPINE: ICD-10-CM

## 2023-01-24 DIAGNOSIS — M47.27 LUMBOSACRAL RADICULOPATHY DUE TO DEGENERATIVE JOINT DISEASE OF SPINE: ICD-10-CM

## 2023-01-24 DIAGNOSIS — G60.8 IDIOPATHIC SMALL AND LARGE FIBER SENSORY NEUROPATHY: ICD-10-CM

## 2023-01-24 PROCEDURE — A9576 INJ PROHANCE MULTIPACK: HCPCS | Performed by: PSYCHIATRY & NEUROLOGY

## 2023-01-24 PROCEDURE — 72148 MRI LUMBAR SPINE W/O DYE: CPT

## 2023-01-24 PROCEDURE — 74011250636 HC RX REV CODE- 250/636: Performed by: PSYCHIATRY & NEUROLOGY

## 2023-01-24 PROCEDURE — 72156 MRI NECK SPINE W/O & W/DYE: CPT

## 2023-01-24 RX ADMIN — GADOTERIDOL 20 ML: 279.3 INJECTION, SOLUTION INTRAVENOUS at 09:26

## 2023-01-24 NOTE — TELEPHONE ENCOUNTER
JOSUE hernadez/ Dr. Anu Pena office called. Dr Marlo Koyanagi would prefer that pt get EMG at our location.  Looks like Priscila Vyas already put a call into pt to have him keep the 3/08/23 appt

## 2023-01-24 NOTE — TELEPHONE ENCOUNTER
Spoke with patient. Verified patient with two patient identifiers. He agrees to keep his EMG appt here 3-8-2023. He would like to be called earlier if we any any cancellations or openings. Patient verbalized understanding.

## 2023-01-25 ENCOUNTER — TELEPHONE (OUTPATIENT)
Dept: NEUROLOGY | Age: 63
End: 2023-01-25

## 2023-01-25 ENCOUNTER — DOCUMENTATION ONLY (OUTPATIENT)
Dept: NEUROLOGY | Age: 63
End: 2023-01-25

## 2023-01-25 NOTE — TELEPHONE ENCOUNTER
Mailed MRI of the spine letter results to pt    Dr. Elizabeth Collins tried to reach, unable to reach pt.

## 2023-01-30 ENCOUNTER — TELEPHONE (OUTPATIENT)
Dept: NEUROLOGY | Age: 63
End: 2023-01-30

## 2023-01-30 NOTE — TELEPHONE ENCOUNTER
Patient's wife, Katherine Martinez, called requesting that Pt's office notes from 1/12 and the MRI & lab results from 1/24 be sent to Pt's PCP, Dr. Bibiana Soler.     Dr Kitty Casas office Ph #:   [phone]    Fax #:  [phone]

## 2023-03-08 ENCOUNTER — OFFICE VISIT (OUTPATIENT)
Dept: NEUROLOGY | Age: 63
End: 2023-03-08
Payer: COMMERCIAL

## 2023-03-08 DIAGNOSIS — G37.9 DEMYELINATING DISEASE OF CENTRAL NERVOUS SYSTEM (HCC): ICD-10-CM

## 2023-03-08 DIAGNOSIS — G56.21 ULNAR NEUROPATHY AT ELBOW OF RIGHT UPPER EXTREMITY: ICD-10-CM

## 2023-03-08 DIAGNOSIS — G56.03 BILATERAL CARPAL TUNNEL SYNDROME: Primary | ICD-10-CM

## 2023-03-08 DIAGNOSIS — G56.22 ULNAR NEUROPATHY AT ELBOW OF LEFT UPPER EXTREMITY: ICD-10-CM

## 2023-03-08 DIAGNOSIS — M47.22 CERVICAL RADICULOPATHY DUE TO DEGENERATIVE JOINT DISEASE OF SPINE: ICD-10-CM

## 2023-03-08 DIAGNOSIS — G60.8 IDIOPATHIC SMALL AND LARGE FIBER SENSORY NEUROPATHY: ICD-10-CM

## 2023-03-08 DIAGNOSIS — M47.27 LUMBOSACRAL RADICULOPATHY DUE TO DEGENERATIVE JOINT DISEASE OF SPINE: ICD-10-CM

## 2023-03-08 PROCEDURE — 95886 MUSC TEST DONE W/N TEST COMP: CPT | Performed by: PSYCHIATRY & NEUROLOGY

## 2023-03-08 PROCEDURE — 95913 NRV CNDJ TEST 13/> STUDIES: CPT | Performed by: PSYCHIATRY & NEUROLOGY

## 2023-03-08 RX ORDER — MULTIVITAMIN
1 TABLET ORAL DAILY
COMMUNITY

## 2023-03-08 RX ORDER — CHOLECALCIFEROL (VITAMIN D3) 125 MCG
CAPSULE ORAL
COMMUNITY

## 2023-03-08 NOTE — LETTER
3/8/2023 10:21 AM    Patient:  Terri Woods   YOB: 1960  Date of Visit: 3/8/2023      Dear   No Recipients: Thank you for referring Mr. Terri Woods to me for evaluation/treatment. Below are the relevant portions of my assessment and plan of care. 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 We encouraged the patient to stay physically mentally active, exercise regular, take a multivitamin vitamin D every day, try to lose weight, to help prevent further neuropathy. We will see him back in 3 to 6 months time he will call us if any problem. If you have questions, please do not hesitate to call me. I look forward to following Mr. Moreno along with you.         Sincerely,      [facility]

## 2023-03-08 NOTE — PROCEDURES
ELECTRODIAGNOSTIC REPORT      Test Date:  3/8/2023    Patient: Echo Penny : 1960 Physician: Juan Pablo Perez M.D. Sex: Male  < Ref Phys:      Technician: Dhaval Sharma    Patient History: Patient with hemoglobin A1c of 5.7 but otherwise normal lab work for neuropathy, for EMG study to evaluate for neuropathy, evaluate for possible carpal tunnel syndrome, which is better after injection in his left wrist, and possible ulnar nerve compression documented in the past.  Rule out cervical radiculopathy or lumbar radiculopathy. Neuro Exam: Patient has hypoactive but symmetric reflexes throughout, but no Babinski or clonus present. Patient has borderline Tinel's over both ulnar nerves at the elbow and median nerves at the wrist.  Patient with slight decreased pin temperature and vibration in both feet to ankle level. Patient has normal muscle bulk and tone in all muscles tested. Patient has normal cranial nerve exam and normal mental status normal gait and station and cerebellar testing. EMG report: This study shows electrophysiologic evidence of an essentially normal EMG and nerve conduction velocity study except for    1.)  Decreased sensory conduction of the left superior fibular nerve, which could indicate a small fiber sensory neuropathy. 2.)  Mild prolongation of the distal left median sensory latency which could be indicative of a very mild early carpal tunnel syndrome without evidence of denervation present or motor prolongation. 3.)  Minimal findings on the left ulnar nerve that could be consistent with previously known compressive neuropathy of the median nerve at the elbow.   There was no clear evidence of a right carpal tunnel syndrome, but motor latencies were near upper limits of normal.  Clinical correlation recommended, and repeat studies in 6 to 12 months time may be of further diagnostic benefit if clinically indicated, and may be even skin biopsy for nerve fiber density may be of further diagnostic benefit if indicated in the future. EMG & NCV Findings:  Evaluation of the left Fibular motor nerve showed normal distal onset latency (4.8 ms), normal amplitude (6.7 mV), normal conduction velocity (B Fib-Ankle, 40 m/s), and normal conduction velocity (Poplt-B Fib, 56 m/s). The left median motor and the right median motor nerves showed normal distal onset latency (L4.3, R4.5 ms), normal amplitude (L7.8, R4.4 mV), and normal conduction velocity (Elbow-Wrist, L55, R56 m/s). The left tibial motor nerve showed normal distal onset latency (3.7 ms), normal amplitude (9.3 mV), and normal conduction velocity (Knee-Ankle, 40 m/s). The left ulnar motor and the right ulnar motor nerves showed normal distal onset latency (L3.1, R3.1 ms), normal amplitude (L10.8, R11.5 mV), decreased conduction velocity (Wrist-Abd Dig Minimi, L26, R26 m/s), normal conduction velocity (B Elbow-Wrist, L55, R53 m/s), and normal conduction velocity (A Elbow-B Elbow, L53, R71 m/s). The left median sensory and the left ulnar sensory nerves showed prolonged distal peak latency (L4.2, L4.9 ms) and normal amplitude (L19.3, L10.8 µV). The right median sensory, the left radial sensory, the right radial sensory, the left sural sensory, and the right ulnar sensory nerves showed normal distal peak latency (R4.0, L2.0, R2.2, L3.0, R3.6 ms) and normal amplitude (R11.6, L24.7, R22.7, L6.6, R12.8 µV). The left Sup Fibular sensory nerve showed no response (Lower leg). The left median/ulnar (palm) comparison and the right median/ulnar (palm) comparison nerves showed prolonged distal peak latency (Median Palm, L2.4, R2.5 ms), reduced amplitude (Median Palm, L15.7, R17.8 µV), normal distal peak latency (Ulnar Palm, L2.2, R2.1 ms), reduced amplitude (Ulnar Palm, L5.7, R9.5 µV), and normal peak latency difference (Median Palm-Ulnar Palm, L0.2, R0.4 ms). All left vs. right side differences were within normal limits. __________________  Rome Deep, MD        Nerve Conduction Studies  Anti Sensory Summary Table     Stim Site NR Onset (ms) Peak (ms) O-P Amp (µV) Norm Peak (ms) Norm O-P Amp Site1 Site2 Dist (cm) Norm Ramsey (m/s)   Left Median Anti Sensory (2nd Digit)  33.6°C   Wrist    3.3 4.2 19.3 <4 >11 Wrist 2nd Digit 14.0    Right Median Anti Sensory (2nd Digit)  33.6°C   Wrist    3.1 4.0 11.6 <4 >11 Wrist 2nd Digit 14.0    Left Radial Anti Sensory (Base 1st Digit)  33.6°C   Wrist    1.4 2.0 24.7 <2.9 >15 Wrist Base 1st Digit 10.0    Right Radial Anti Sensory (Base 1st Digit)  33.6°C   Wrist    1.6 2.2 22.7 <2.9 >15 Wrist Base 1st Digit 10.0    Left Sup Fibular Anti Sensory (Lat ankle)  33.6°C   Lower leg NR    <4.4 >5.0 Lower leg Lat ankle 10.0 >32   Left Sural Anti Sensory (Lat Mall)  33.6°C   Calf    2.2 3.0 6.6 <4.5 >4.0 Calf Lat Mall 14.0    Left Ulnar Anti Sensory (5th Digit)  33.6°C   Wrist    3.7 4.9 10.8 <4.0 >10 Wrist 5th Digit 14.0    Right Ulnar Anti Sensory (5th Digit)  33.6°C   Wrist    2.6 3.6 12.8 <4.0 >10 Wrist 5th Digit 14.0      Motor Summary Table     Stim Site NR Onset (ms) Norm Onset (ms) O-P Amp (mV) Norm O-P Amp P-T Amp (mV) Site1 Site2 Dist (cm) Ramsey (m/s)   Left Fibular Motor (Ext Dig Brev)  33.6°C   Ankle    4.8 <6.5 6.7 >1.1  Ankle Ext Dig Brev 8.0 17   B Fib    14.8  5.8   B Fib Ankle 40.0 40   Poplt    16.6  5.5   Poplt B Fib 10.0 56   Left Median Motor (Abd Poll Brev)  33.6°C   Wrist    4.3 <4.5 7.8 >4.1  Wrist Abd Poll Brev 8.0 19   Elbow    8.7  6.4   Elbow Wrist 24.0 55   Right Median Motor (Abd Poll Brev)  33.6°C   Wrist    4.5 <4.5 4.4 >4.1  Wrist Abd Poll Brev 8.0 18   Elbow    8.8  4.7   Elbow Wrist 24.0 56   Left Tibial Motor (Abd Booker Brev)  33.6°C   Ankle    3.7 <6.1 9.3 >4.4  Ankle Abd Booker Brev 8.0 22   Knee    15.0  6.2   Knee Ankle 45.0 40   Left Ulnar Motor (Abd Dig Minimi)  33.6°C   Wrist    3.1 <3.1 10.8 >7.0  Wrist Abd Dig Minimi 8.0 26   B Elbow    7.3  10.4   B Elbow Wrist 23.0 55   A Elbow    9.2  9.9   A Elbow B Elbow 10.0 53   Right Ulnar Motor (Abd Dig Minimi)  33.6°C   Wrist    3.1 <3.1 11.5 >7.0  Wrist Abd Dig Minimi 8.0 26   B Elbow    7.6  9.8   B Elbow Wrist 24.0 53   A Elbow    9.0  9.5   A Elbow B Elbow 10.0 71     Comparison Summary Table     Stim Site NR Peak (ms) P-T Amp (µV) Site1 Site2 Dist (cm) Delta-P (ms)   Left Median/Ulnar Palm Comparison (Wrist)  33.6°C   Median Palm    2.4 22.2 Median Palm Ulnar Palm 8.0 0.2   Ulnar Palm    2.2 12.0       Right Median/Ulnar Palm Comparison (Wrist)  33.6°C   Median Palm    2.5 29.3 Median Palm Ulnar Palm 8.0 0.4   Ulnar Palm    2.1 15.4         EMG     Side Muscle Nerve Root Ins Act Fibs Psw Recrt Duration Amp Poly Comment   Left Deltoid Axillary C5-6 Nml Nml Nml Nml Nml Nml Nml    Left Biceps Musculocut C5-6 Nml Nml Nml Nml Nml Nml Nml    Right Biceps Musculocut C5-6 Nml Nml Nml Nml Nml Nml Nml    Right Deltoid Axillary C5-6 Nml Nml Nml Nml Nml Nml Nml    Right Triceps Radial C6-7-8 Nml Nml Nml Nml Nml Nml Nml    Left Triceps Radial C6-7-8 Nml Nml Nml Nml Nml Nml Nml    Left Lower Cerv Parasp Rami C7,T1 Nml Nml Nml Nml Nml Nml Nml    Right Lower Cerv Parasp Rami C7,T1 Nml Nml Nml Nml Nml Nml Nml    Left 1stDorInt Ulnar C8-T1 Nml Nml Nml Nml Nml Nml Nml    Right Abd Poll Brev Median C8-T1 Nml Nml Nml Nml Nml Nml Nml    Right 1stDorInt Ulnar C8-T1 Nml Nml Nml Nml Nml Nml Nml    Left Abd Poll Brev Median C8-T1 Nml Nml Nml Nml Nml Nml Nml    Left VastusLat Femoral L2-4 Nml Nml Nml Nml Nml Nml Nml    Left AntTibialis Dp Br Peron L4-5 Nml Nml Nml Nml Nml Nml Nml    Left Ext Dig Brev Dp Br Peron L5, S1 Nml Nml Nml Nml Nml Nml Nml    Left Lower Lumb Parasp Rami L5,S1 Nml Nml Nml Nml Nml Nml Nml    Left AbdHallucis MedPlantar S1-2 Nml Nml Nml Nml Nml Nml Nml    Left MedGastroc Tibial S1-2 Nml Nml Nml Nml Nml Nml Nml          Waveforms:

## 2023-03-08 NOTE — PROGRESS NOTES
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 We encouraged the patient to stay physically mentally active, exercise regular, take a multivitamin vitamin D every day, try to lose weight, to help prevent further neuropathy. We will see him back in 3 to 6 months time he will call us if any problem.

## 2023-05-21 RX ORDER — ATORVASTATIN CALCIUM 40 MG/1
40 TABLET, FILM COATED ORAL DAILY
COMMUNITY
Start: 2015-07-29

## 2023-05-21 RX ORDER — LISINOPRIL 10 MG/1
10 TABLET ORAL DAILY
COMMUNITY
Start: 2019-01-28

## 2025-07-31 ENCOUNTER — OFFICE VISIT (OUTPATIENT)
Age: 65
End: 2025-07-31
Payer: COMMERCIAL

## 2025-07-31 VITALS
WEIGHT: 207 LBS | BODY MASS INDEX: 28.04 KG/M2 | DIASTOLIC BLOOD PRESSURE: 62 MMHG | HEIGHT: 72 IN | OXYGEN SATURATION: 98 % | HEART RATE: 71 BPM | SYSTOLIC BLOOD PRESSURE: 114 MMHG | RESPIRATION RATE: 18 BRPM

## 2025-07-31 DIAGNOSIS — R26.89 BALANCE DISORDER: ICD-10-CM

## 2025-07-31 DIAGNOSIS — G56.22 ULNAR NEUROPATHY AT ELBOW OF LEFT UPPER EXTREMITY: ICD-10-CM

## 2025-07-31 DIAGNOSIS — R20.0 NUMBNESS IN FEET: Primary | ICD-10-CM

## 2025-07-31 PROCEDURE — 99215 OFFICE O/P EST HI 40 MIN: CPT | Performed by: NURSE PRACTITIONER

## 2025-07-31 ASSESSMENT — PATIENT HEALTH QUESTIONNAIRE - PHQ9
SUM OF ALL RESPONSES TO PHQ QUESTIONS 1-9: 0
SUM OF ALL RESPONSES TO PHQ QUESTIONS 1-9: 0
2. FEELING DOWN, DEPRESSED OR HOPELESS: NOT AT ALL
SUM OF ALL RESPONSES TO PHQ QUESTIONS 1-9: 0
SUM OF ALL RESPONSES TO PHQ QUESTIONS 1-9: 0
1. LITTLE INTEREST OR PLEASURE IN DOING THINGS: NOT AT ALL

## 2025-07-31 NOTE — PROGRESS NOTES
[facility]  [address]  [address]  [place] [address]  Tel: [phone]  Fax: [phone]      Date:  25     Name:  JLUIS CABRERA  :  1960  MRN:  [id number]     PCP:  Siva Barragan Jr., MD    Chief Complaint   Patient presents with    Neurologic Problem     Follow up for neuropathy - started in feet - right side of leg to right side of buttocks - has worsened greatly        HISTORY OF PRESENT ILLNESS:  History of Present Illness  The patient is a 65-year-old male here today for a regular follow-up appointment. He has previously been seen, with the last visit being with Dr. Humphreys in .    He reports experiencing numbness in his feet, which has progressively extended to his right leg and up to his buttock. Occasionally, he feels sharp pain in his toenails and toes, but he does not experience any associated pain otherwise. He does not have any weakness in his legs or back pain. He maintains good hydration and occasionally feels unsteady but has not had any falls. He does not experience any numbness or symptoms in his hands. 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 He has not had any recent blood work done here, but his primary care physician has checked his labs recently. He is not diabetic and his cholesterol is probably a little high. He does not engage in regular exercise but works hard. He sleeps well, averaging 7 hours per night. He is sensitive to certain products; excessive consumption of coffee, carbonated drinks, or caffeine can lead to kidney stones, urinary tract infections, and prostate swelling. He does not experience any new symptoms. He has a history of wrist arthritis, which was previously debilitating but has improved significantly since he started fasting daily 3 years ago. He has made  Instructions: This plan will send the code FBSD to the PM system.  DO NOT or CHANGE the price. Detail Level: Simple Price (Do Not Change): 0.00

## 2025-07-31 NOTE — PROGRESS NOTES
Chief Complaint   Patient presents with    Neurologic Problem     Follow up for neuropathy - started in feet - right side of leg to right side of buttocks - has worsened greatly      1. Have you been to the ER, urgent care clinic since your last visit?  Hospitalized since your last visit? No     2. Have you seen or consulted any other health care providers outside of the [facility] since your last visit?  Include any pap smears or colon screening.  No